# Patient Record
Sex: FEMALE | Race: WHITE | Employment: FULL TIME | URBAN - METROPOLITAN AREA
[De-identification: names, ages, dates, MRNs, and addresses within clinical notes are randomized per-mention and may not be internally consistent; named-entity substitution may affect disease eponyms.]

---

## 2024-03-25 ENCOUNTER — TELEPHONE (OUTPATIENT)
Age: 29
End: 2024-03-25

## 2024-03-25 NOTE — TELEPHONE ENCOUNTER
Caller: Patient     Doctor/Office: Shawn Nice     CB#: [phone]     Work or school note: Work    Return to work/school date: 4/9/24    Fax #: [phone]    Is there any restrictions that need to be updated? If so, what? Light duty when returning to work     Patient would also like to confirm if it's okay for her to start swimming yet ?   Please advise

## 2024-03-25 NOTE — TELEPHONE ENCOUNTER
Note placed and faxed. Lvm for pt that Dr. Escobar still says no swimming and wait until next visit to re-evaluate..

## 2024-04-08 ENCOUNTER — OFFICE VISIT (OUTPATIENT)
Age: 29
End: 2024-04-08

## 2024-04-08 ENCOUNTER — APPOINTMENT (OUTPATIENT)
Dept: RADIOLOGY | Facility: CLINIC | Age: 29
End: 2024-04-08
Payer: COMMERCIAL

## 2024-04-08 VITALS
WEIGHT: 208 LBS | HEIGHT: 61 IN | HEART RATE: 62 BPM | DIASTOLIC BLOOD PRESSURE: 77 MMHG | BODY MASS INDEX: 39.27 KG/M2 | SYSTOLIC BLOOD PRESSURE: 111 MMHG

## 2024-04-08 DIAGNOSIS — E55.9 VITAMIN D DEFICIENCY: ICD-10-CM

## 2024-04-08 DIAGNOSIS — S99.192D CLOSED FRACTURE OF BASE OF FIFTH METATARSAL BONE OF LEFT FOOT AT METAPHYSEAL-DIAPHYSEAL JUNCTION WITH ROUTINE HEALING, SUBSEQUENT ENCOUNTER: ICD-10-CM

## 2024-04-08 DIAGNOSIS — S99.192D CLOSED FRACTURE OF BASE OF FIFTH METATARSAL BONE OF LEFT FOOT AT METAPHYSEAL-DIAPHYSEAL JUNCTION WITH ROUTINE HEALING, SUBSEQUENT ENCOUNTER: Primary | ICD-10-CM

## 2024-04-08 DIAGNOSIS — Z98.890 POSTOPERATIVE STATE: ICD-10-CM

## 2024-04-08 PROCEDURE — 73630 X-RAY EXAM OF FOOT: CPT

## 2024-04-08 PROCEDURE — 99024 POSTOP FOLLOW-UP VISIT: CPT | Performed by: STUDENT IN AN ORGANIZED HEALTH CARE EDUCATION/TRAINING PROGRAM

## 2024-04-08 NOTE — LETTER
April 8, 2024     Patient: LYNDON Reed  YOB: 1995  Date of Visit: 4/8/2024      To Whom it May Concern:    LYNDON Reed is under my professional care. LYNDON was seen in my office on 4/8/2024. LYNDON may return to work with limitations of being allowed to wear her cam boot at all times until 4/23/2024 .    If you have any questions or concerns, please don't hesitate to call.         Sincerely,          Jamshid Escobar DPM        CC: No Recipients

## 2024-04-08 NOTE — LETTER
April 8, 2024     Patient: Jessica Reed  YOB: 1995  Date of Visit: 4/8/2024      To Whom it May Concern:    Jessica Reed is under my professional care. Jessica was seen in my office on 4/8/2024. Jessica may return to work with limitations of wearing her CAM boot until 4/23/24 .    If you have any questions or concerns, please don't hesitate to call.         Sincerely,          Jamshid Escobar DPM        CC: No Recipients

## 2024-04-08 NOTE — LETTER
April 8, 2024     Patient: Jessica Reed  YOB: 1995  Date of Visit: 4/8/2024      To Whom it May Concern:    Monika Reed is under my professional care. Jessica was seen in my office on 4/8/2024. Monika may return to work with limitations of being allowed to wear her CAM boot until 4/23/24 .    If you have any questions or concerns, please don't hesitate to call.         Sincerely,          Jamshid Escobar DPM        CC: No Recipients

## 2024-04-10 NOTE — PROGRESS NOTES
This patient was seen on  4/8/24 .    My role is Foot , Ankle, and Wound Specialist    ASSESSMENT     Diagnoses and all orders for this visit:    Closed fracture of base of fifth metatarsal bone of left foot at metaphyseal-diaphyseal junction with routine healing, subsequent encounter  -     XR foot 3+ vw left; Future    Postoperative state  -     XR foot 3+ vw left; Future    Vitamin D deficiency  -     Vitamin D 25 hydroxy; Future         Problem List Items Addressed This Visit    None  Visit Diagnoses       Closed fracture of base of fifth metatarsal bone of left foot at metaphyseal-diaphyseal junction with routine healing, subsequent encounter    -  Primary    Relevant Orders    XR foot 3+ vw left (Completed)    Postoperative state        Relevant Orders    XR foot 3+ vw left (Completed)    Vitamin D deficiency        Relevant Orders    Vitamin D 25 hydroxy          PLAN  -Monika remains stable postoperatively  -Weightbearing as tolerated to left foot in cam boot  -Continue vitamin D supplementation, we will retest this at the patient's next visit  -Return to clinic 2 weeks we will consider transition to sneakers at this time.    SUBJECTIVE    Chief Complaint:  Status post left fifth metatarsal open reduction with internal fixation, date of surgery 2/27/2024     Patient ID: MONIKA Reed     4/8/2024: Monika is doing well today: She complains of only minor pain to the plantar aspect of her fifth metatarsal head.  She states that she has been ambulating in her cam boot since her last appointment without complication.        The following portions of the patient's history were reviewed and updated as appropriate: allergies, current medications, past family history, past medical history, past social history, past surgical history and problem list.    Review of Systems   Constitutional: Negative.    HENT: Negative.     Respiratory: Negative.     Cardiovascular: Negative.    Gastrointestinal: Negative.   "  Musculoskeletal: Negative.    Skin: Negative.    Neurological: Negative.          OBJECTIVE      /77   Pulse 62   Ht 5' 1\" (1.549 m)   Wt 94.3 kg (208 lb)   BMI 39.30 kg/m²        Physical Exam  Constitutional:       Appearance: Normal appearance.   HENT:      Head: Normocephalic and atraumatic.   Eyes:      General:         Right eye: No discharge.         Left eye: No discharge.   Cardiovascular:      Rate and Rhythm: Normal rate and regular rhythm.      Pulses:           Dorsalis pedis pulses are 2+ on the right side and 2+ on the left side.        Posterior tibial pulses are 2+ on the right side and 2+ on the left side.   Pulmonary:      Effort: Pulmonary effort is normal.      Breath sounds: Normal breath sounds.   Skin:     General: Skin is warm.      Capillary Refill: Capillary refill takes less than 2 seconds.   Neurological:      Sensory: Sensation is intact. No sensory deficit.         MSK:  -Only mild pain on palpation to the fifth metatarsal head plantarly  -No gross deformities noted   -MMT is 5/5 to all muscle compartments of the lower extremity     Derm:  -No lesions, abrasions, or open wounds noted  -No noted interdigital maceration, peeling, malodor  -No callus formation noted on exam      "

## 2024-04-22 ENCOUNTER — APPOINTMENT (OUTPATIENT)
Dept: RADIOLOGY | Facility: CLINIC | Age: 29
End: 2024-04-22
Payer: COMMERCIAL

## 2024-04-22 ENCOUNTER — OFFICE VISIT (OUTPATIENT)
Age: 29
End: 2024-04-22

## 2024-04-22 VITALS
DIASTOLIC BLOOD PRESSURE: 76 MMHG | HEIGHT: 61 IN | SYSTOLIC BLOOD PRESSURE: 120 MMHG | WEIGHT: 208 LBS | HEART RATE: 75 BPM | BODY MASS INDEX: 39.27 KG/M2

## 2024-04-22 DIAGNOSIS — S99.192D CLOSED FRACTURE OF BASE OF FIFTH METATARSAL BONE OF LEFT FOOT AT METAPHYSEAL-DIAPHYSEAL JUNCTION WITH ROUTINE HEALING, SUBSEQUENT ENCOUNTER: ICD-10-CM

## 2024-04-22 DIAGNOSIS — S99.192D CLOSED FRACTURE OF BASE OF FIFTH METATARSAL BONE OF LEFT FOOT AT METAPHYSEAL-DIAPHYSEAL JUNCTION WITH ROUTINE HEALING, SUBSEQUENT ENCOUNTER: Primary | ICD-10-CM

## 2024-04-22 PROCEDURE — 99024 POSTOP FOLLOW-UP VISIT: CPT | Performed by: STUDENT IN AN ORGANIZED HEALTH CARE EDUCATION/TRAINING PROGRAM

## 2024-04-22 PROCEDURE — 73630 X-RAY EXAM OF FOOT: CPT

## 2024-04-22 NOTE — PATIENT INSTRUCTIONS
Purchase a supportive pair of sneakers such as Waddell, Lewis, Sasarahony, New Balance.  Some qualities to look for is that the shoe should bend only where the toes bend, the sole should not be too flimsy, it should have a wide toe box and a somewhat stiff heel support. Purchase a supportive over-the-counter pair of inserts such as Superfeet, powersteps, [employer].

## 2024-04-22 NOTE — LETTER
April 22, 2024     Patient: LYNDON Reed  YOB: 1995  Date of Visit: 4/22/2024      To Whom it May Concern:    LYNDON Reed is under my professional care. LYNDON was seen in my office on 4/22/2024. LYNDON may return to work on 4/22/2024 with no restrictions .    If you have any questions or concerns, please don't hesitate to call.         Sincerely,          Jamshid Escobar DPM        CC: No Recipients

## 2024-04-22 NOTE — PROGRESS NOTES
This patient was seen on  4/22/24 .    My role is Foot , Ankle, and Wound Specialist    ASSESSMENT     Diagnoses and all orders for this visit:    Closed fracture of base of fifth metatarsal bone of left foot at metaphyseal-diaphyseal junction with routine healing, subsequent encounter  -     X-ray foot left 3+ views; Future         Problem List Items Addressed This Visit    None  Visit Diagnoses       Closed fracture of base of fifth metatarsal bone of left foot at metaphyseal-diaphyseal junction with routine healing, subsequent encounter    -  Primary    Relevant Orders    X-ray foot left 3+ views          PLAN  -Monika remains stable postoperatively  -Weightbearing as tolerated to left foot in supportive sneakers, I did provide her with a list of sneakers that she should follow.  -Follow-up new vitamin D levels  -Monika may now return to work without restriction  -Return to clinic 4 weeks    X-ray of the left foot from April 22, 2024 interpreted independently: Medullary screw fixation across the fifth metatarsal Adame fracture remains intact with proper placement of fixation.  Osseous ingrowth noted across fracture sites suggesting routine healing    SUBJECTIVE    Chief Complaint:  Status post left fifth metatarsal open reduction with internal fixation, date of surgery 2/27/2024     Patient ID: MONIKA Reed     4/19/2024: Monika is doing well today: She complains of only minor pain to the plantar aspect of her fifth metatarsal head.  She states that she has been ambulating in sneakers since her last visit without complication.  She has been back to work and has been using her cam walker while at work.        The following portions of the patient's history were reviewed and updated as appropriate: allergies, current medications, past family history, past medical history, past social history, past surgical history and problem list.    Review of Systems   Constitutional: Negative.    HENT: Negative.     Respiratory:  "Negative.     Cardiovascular: Negative.    Gastrointestinal: Negative.    Musculoskeletal: Negative.    Skin: Negative.    Neurological: Negative.          OBJECTIVE      /76   Pulse 75   Ht 5' 1\" (1.549 m)   Wt 94.3 kg (208 lb)   BMI 39.30 kg/m²        Physical Exam  Constitutional:       Appearance: Normal appearance.   HENT:      Head: Normocephalic and atraumatic.   Eyes:      General:         Right eye: No discharge.         Left eye: No discharge.   Cardiovascular:      Rate and Rhythm: Normal rate and regular rhythm.      Pulses:           Dorsalis pedis pulses are 2+ on the right side and 2+ on the left side.        Posterior tibial pulses are 2+ on the right side and 2+ on the left side.   Pulmonary:      Effort: Pulmonary effort is normal.      Breath sounds: Normal breath sounds.   Skin:     General: Skin is warm.      Capillary Refill: Capillary refill takes less than 2 seconds.   Neurological:      Sensory: Sensation is intact. No sensory deficit.         MSK:  -Only mild pain on palpation to the fifth metatarsal head plantarly, no pain on palpation to the fifth metatarsal base laterally.  -No gross deformities noted   -MMT is 5/5 to all muscle compartments of the lower extremity     Derm:  -No lesions, abrasions, or open wounds noted  -No noted interdigital maceration, peeling, malodor  -No callus formation noted on exam      "

## 2024-04-23 DIAGNOSIS — Z00.6 ENCOUNTER FOR EXAMINATION FOR NORMAL COMPARISON OR CONTROL IN CLINICAL RESEARCH PROGRAM: ICD-10-CM

## 2024-05-02 ENCOUNTER — TELEPHONE (OUTPATIENT)
Age: 29
End: 2024-05-02

## 2024-05-02 NOTE — TELEPHONE ENCOUNTER
Caller: Monika Reed    Doctor and/or Office: Dr. Escobar/[facility]#: [phone]    Escalation: Surgery Patient has questions about an xray and notes on the xray that she does not understand. Requesting a return call. Thank you

## 2024-05-13 ENCOUNTER — OFFICE VISIT (OUTPATIENT)
Dept: FAMILY MEDICINE CLINIC | Facility: CLINIC | Age: 29
End: 2024-05-13
Payer: COMMERCIAL

## 2024-05-13 VITALS
BODY MASS INDEX: 40.97 KG/M2 | WEIGHT: 217 LBS | SYSTOLIC BLOOD PRESSURE: 118 MMHG | HEART RATE: 110 BPM | RESPIRATION RATE: 18 BRPM | HEIGHT: 61 IN | DIASTOLIC BLOOD PRESSURE: 64 MMHG | TEMPERATURE: 99 F | OXYGEN SATURATION: 97 %

## 2024-05-13 DIAGNOSIS — F33.0 MILD EPISODE OF RECURRENT MAJOR DEPRESSIVE DISORDER (HCC): ICD-10-CM

## 2024-05-13 DIAGNOSIS — E66.01 CLASS 3 SEVERE OBESITY DUE TO EXCESS CALORIES WITHOUT SERIOUS COMORBIDITY WITH BODY MASS INDEX (BMI) OF 40.0 TO 44.9 IN ADULT (HCC): ICD-10-CM

## 2024-05-13 DIAGNOSIS — F41.9 ANXIETY: Primary | ICD-10-CM

## 2024-05-13 PROBLEM — L20.89 FLEXURAL ATOPIC DERMATITIS: Status: ACTIVE | Noted: 2017-07-25

## 2024-05-13 PROCEDURE — 99214 OFFICE O/P EST MOD 30 MIN: CPT | Performed by: NURSE PRACTITIONER

## 2024-05-13 RX ORDER — FLUOXETINE 20 MG/1
20 TABLET, FILM COATED ORAL DAILY
Qty: 90 TABLET | Refills: 3 | Status: SHIPPED | OUTPATIENT
Start: 2024-05-13

## 2024-05-13 NOTE — PROGRESS NOTES
FAMILY PRACTICE OFFICE VISIT       NAME: LYNDON Reed  AGE: 29 y.o. SEX: female       : 1995        MRN: [id number]    DATE: 2024  TIME: 1:09 PM    Assessment and Plan   1. Anxiety  -     FLUoxetine (PROzac) 20 MG tablet; Take 1 tablet (20 mg total) by mouth daily    2. Mild episode of recurrent major depressive disorder (HCC)  -     FLUoxetine (PROzac) 20 MG tablet; Take 1 tablet (20 mg total) by mouth daily    3. Class 3 severe obesity due to excess calories without serious comorbidity with body mass index (BMI) of 40.0 to 44.9 in adult (HCC)  -     Semaglutide-Weight Management (WEGOVY) 0.25 MG/0.5ML; Inject 0.5 mL (0.25 mg total) under the skin once a week         There are no Patient Instructions on file for this visit.        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   HENT:  Negative for congestion and postnasal drip.    Eyes:  Negative for photophobia and visual disturbance.   Respiratory:  Negative for cough, shortness of breath and wheezing.    Cardiovascular:  Negative for chest pain and palpitations.   Gastrointestinal:  Negative for constipation, diarrhea, nausea and vomiting.   Genitourinary:  Negative for dysuria and frequency.   Musculoskeletal:  Negative for arthralgias and myalgias.   Skin:  Negative for rash.   Neurological:  Negative for  dizziness, light-headedness and headaches.   Hematological:  Negative for adenopathy.   Psychiatric/Behavioral:  Positive for decreased concentration and dysphoric mood. Negative for behavioral problems, sleep disturbance and suicidal ideas. The patient is nervous/anxious. The patient is not hyperactive.        Active Problem List     Patient Active Problem List   Diagnosis    Anxiety    Recurrent major depressive disorder, in partial remission (HCC)    Insomnia    Functional murmur    Obesity    Asthma    Upper respiratory tract infection    Viral illness    Closed fracture of base of fifth metatarsal bone of left foot at metaphyseal-diaphyseal junction, initial encounter    Adjustment disorder    Flexural atopic dermatitis    Mild intermittent asthma without complication    Mild episode of recurrent major depressive disorder (HCC)         Past Medical History:  Past Medical History:   Diagnosis Date    Allergic     Allergic rhinitis     Anesthesia complication     woke up in the middle of wisdom teeth removal    Anxiety 02/20/2018    Asthma     Asthma     Depression     Functional murmur 11/19/2012    History of back pain     History of depression     History of impacted cerumen     unspecified laterality    History of pharyngitis     History of streptococcal pharyngitis     History of tonsillitis     History of URI (upper respiratory infection)     Obesity 11/19/2012    Recurrent major depressive disorder, in partial remission (HCC) 02/20/2018    Seasonal allergies     Sinus problem     Toe fracture, left 02/18/2024    5th toe    Tooth wear     gem with dental bond-upper left    Viral illness 11/02/2023       Past Surgical History:  Past Surgical History:   Procedure Laterality Date    ORIF FOOT FRACTURE Left 2/27/2024    Procedure: LEFT FOOT 5TH METATARSAL OPEN REDUCTION WITH INTERNAL FIXATION;  Surgeon: Jamshid Escobar DPM;  Location: [facility];  Service: Podiatry    WISDOM TOOTH EXTRACTION         Family  History:  Family History   Problem Relation Age of Onset    Hypothyroidism Mother     Osteoporosis Mother     Breast cancer Mother 50    Thyroid disease Mother     Diabetes Father     Arthritis Father     Asthma Father     Heart defect Other         Cardiac disorder    Mental illness Neg Hx         Mother       Social History:  Social History     Socioeconomic History    Marital status: Single     Spouse name: Not on file    Number of children: Not on file    Years of education: Not on file    Highest education level: Not on file   Occupational History    Not on file   Tobacco Use    Smoking status: Former     Types: Cigarettes    Smokeless tobacco: Never    Tobacco comments:     History social smoking 2012 quit   Vaping Use    Vaping status: Never Used   Substance and Sexual Activity    Alcohol use: Not Currently    Drug use: Not Currently     Types: Cocaine     Comment: quit 2016    Sexual activity: Yes     Partners: Male     Birth control/protection: None   Other Topics Concern    Not on file   Social History Narrative    Always uses seat belt    Caffeine use    Exercise habits    No living will    No preference on Sikhism beliefs    Spouse/family support     Social Determinants of Health     Financial Resource Strain: Not on file   Food Insecurity: Not on file   Transportation Needs: Not on file   Physical Activity: Not on file   Stress: Not on file   Social Connections: Not on file   Intimate Partner Violence: Not on file   Housing Stability: Not on file       Objective     Vitals:    05/13/24 1023   BP: 118/64   Pulse: (!) 110   Resp: 18   Temp: 99 °F (37.2 °C)   SpO2: 97%     Wt Readings from Last 3 Encounters:   05/13/24 98.4 kg (217 lb)   04/22/24 94.3 kg (208 lb)   04/08/24 94.3 kg (208 lb)       Physical Exam  Vitals and nursing note reviewed.   Constitutional:       Appearance: Normal appearance.   HENT:      Head: Normocephalic and atraumatic.      Right Ear: Tympanic membrane, ear canal and external  "ear normal.      Left Ear: Tympanic membrane, ear canal and external ear normal.      Nose: Nose normal.      Mouth/Throat:      Mouth: Mucous membranes are moist.   Eyes:      Conjunctiva/sclera: Conjunctivae normal.   Cardiovascular:      Rate and Rhythm: Normal rate and regular rhythm.      Heart sounds: Normal heart sounds.   Pulmonary:      Effort: Pulmonary effort is normal.      Breath sounds: Normal breath sounds.   Abdominal:      General: Bowel sounds are normal.      Palpations: Abdomen is soft.   Musculoskeletal:         General: Normal range of motion.      Cervical back: Normal range of motion and neck supple.   Skin:     General: Skin is warm and dry.      Capillary Refill: Capillary refill takes less than 2 seconds.   Neurological:      General: No focal deficit present.      Mental Status: She is alert and oriented to person, place, and time.   Psychiatric:         Mood and Affect: Mood normal.         Behavior: Behavior normal.         Thought Content: Thought content normal.         Judgment: Judgment normal.         Pertinent Laboratory/Diagnostic Studies:  Lab Results   Component Value Date    GLUCOSE 94 09/21/2015    BUN 10 04/21/2023    CREATININE 0.70 04/21/2023    CALCIUM 9.4 04/21/2023     09/21/2015    K 4.1 04/21/2023    CO2 23 04/21/2023     04/21/2023     Lab Results   Component Value Date    ALT 58 04/21/2023    AST 34 04/21/2023    ALKPHOS 56 04/21/2023       Lab Results   Component Value Date    WBC 6.96 05/27/2021    HGB 14.6 05/27/2021    HCT 43.7 05/27/2021    MCV 93 05/27/2021     05/27/2021       No results found for: \"TSH\"    No results found for: \"CHOL\"  Lab Results   Component Value Date    TRIG 207 (H) 04/21/2023     Lab Results   Component Value Date    HDL 54 04/21/2023     Lab Results   Component Value Date    LDLCALC 113 (H) 04/21/2023     No results found for: \"HGBA1C\"    Results for orders placed or performed during the hospital encounter of 02/27/24 "   POCT pregnancy, urine   Result Value Ref Range    EXT Preg Test, Ur Negative Negative    Control Valid Valid       No orders of the defined types were placed in this encounter.      ALLERGIES:  Allergies   Allergen Reactions    Pineapple - Food Allergy Anaphylaxis     Itching/closing of the throat    Amoxicillin GI Intolerance    Mold Extract [Trichophyton] Allergic Rhinitis    Seasonal Ic [Cholestatin] Allergic Rhinitis       Current Medications     Current Outpatient Medications   Medication Sig Dispense Refill    albuterol (Ventolin HFA) 90 mcg/act inhaler Inhale 2 puffs every 4 (four) hours as needed for wheezing 18 g 5    citalopram (CeleXA) 20 mg tablet Take 1 tablet (20 mg total) by mouth daily 90 tablet 1    Clocortolone Pivalate (Cloderm) 0.1 % cream Apply topically 2 (two) times a day To affected area 45 g 0    Elderberry 500 MG CAPS Take by mouth      fexofenadine (ALLEGRA) 180 MG tablet Take by mouth      FLUoxetine (PROzac) 20 MG tablet Take 1 tablet (20 mg total) by mouth daily 90 tablet 3    MAGNESIUM PO Take by mouth daily at bedtime      Multiple Vitamin (multivitamin) capsule Take 1 capsule by mouth daily      Omega-3 Fatty Acids (FISH OIL PO) Take by mouth daily at bedtime      ondansetron (ZOFRAN) 4 mg tablet Take 1 tablet (4 mg total) by mouth every 6 (six) hours 12 tablet 0    Semaglutide-Weight Management (WEGOVY) 0.25 MG/0.5ML Inject 0.5 mL (0.25 mg total) under the skin once a week 2 mL 1    ergocalciferol (VITAMIN D2) 50,000 units Take 1 capsule (50,000 Units total) by mouth once a week (Patient not taking: Reported on 5/13/2024) 8 capsule 0    oxyCODONE-acetaminophen (Percocet) 5-325 mg per tablet Take 1 tablet by mouth every 6 (six) hours as needed for moderate pain for up to 16 doses Max Daily Amount: 4 tablets (Patient not taking: Reported on 3/4/2024) 16 tablet 0     No current facility-administered medications for this visit.         Health Maintenance     Health Maintenance   Topic  Date Due    Depression Follow-up Plan  Never done    Annual Physical  01/18/2024    HIV Screening  06/13/2024 (Originally 5/6/2009)    COVID-19 Vaccine (4 - 2023-24 season) 08/13/2024 (Originally 9/1/2023)    Hepatitis C Screening  06/13/2026 (Originally 1995)    Influenza Vaccine (Season Ended) 09/01/2024    Depression Screening  02/22/2025    DTaP,Tdap,and Td Vaccines (3 - Td or Tdap) 02/18/2026    Cervical Cancer Screening  12/08/2027    Zoster Vaccine (1 of 2) 05/06/2044    Pneumococcal Vaccine: Pediatrics (0 to 5 Years) and At-Risk Patients (6 to 64 Years)  Completed    HPV Vaccine  Completed    HIB Vaccine  Aged Out    IPV Vaccine  Aged Out    Hepatitis A Vaccine  Aged Out    Meningococcal ACWY Vaccine  Aged Out     Immunization History   Administered Date(s) Administered    COVID-19 MODERNA VACC 0.5 ML IM 02/12/2021, 03/12/2021, 12/16/2021    H1N1, All Formulations 10/31/2009    HPV Quadrivalent 08/24/2010, 10/26/2010, 02/03/2011    INFLUENZA 09/25/2015, 10/15/2016, 08/15/2019, 10/29/2020    Influenza Quadrivalent, 6-35 Months IM 10/15/2016    Influenza, injectable, quadrivalent, preservative free 0.5 mL 10/19/2018, 10/04/2019, 09/15/2021, 10/06/2022    Influenza, recombinant, quadrivalent,injectable, preservative free 10/29/2020    Influenza, seasonal, injectable 09/25/2015    Meningococcal, Unknown Serogroups 08/01/2008    Pneumococcal Conjugate Vaccine 20-valent (Pcv20), Polysace 01/18/2023    Tdap 08/01/2008, 02/18/2016       Depression Screening and Follow-up Plan: Patient's depression screening was positive with a PHQ-9 score of 10.     Depression Screening Follow-up Plan: Patient's depression screening was positive with a PHQ-2 score of . Their PHQ-9 score was . Patient assessed for underlying major depression. They have no active suicidal ideations. Brief counseling provided and recommend additional follow-up/re-evaluation next office visit.     ISAIAS Saravia

## 2024-05-15 ENCOUNTER — TELEPHONE (OUTPATIENT)
Dept: FAMILY MEDICINE CLINIC | Facility: CLINIC | Age: 29
End: 2024-05-15

## 2024-05-15 ENCOUNTER — TELEPHONE (OUTPATIENT)
Age: 29
End: 2024-05-15

## 2024-05-15 NOTE — TELEPHONE ENCOUNTER
Patient calling stating that she needs a prior auth for Wegovy . Patient aware that it can take up to 21 business days once the PA is started .  Brenna Morrison

## 2024-05-17 NOTE — TELEPHONE ENCOUNTER
Patient called back for an update on this PA. Message was sent to Sandy for update.   Brenna Morrison

## 2024-05-17 NOTE — TELEPHONE ENCOUNTER
PA for WEGOVY    Submitted via    []CMM-KEY   [x]Jeremy-Case ID # [id number]  []Faxed to plan   []Other website   []Phone call Case ID #     Office notes sent, clinical questions answered. Awaiting determination    Turnaround time for your insurance to make a decision on your Prior Authorization can take 7-21 business days.

## 2024-05-18 ENCOUNTER — APPOINTMENT (OUTPATIENT)
Dept: LAB | Facility: CLINIC | Age: 29
End: 2024-05-18
Payer: COMMERCIAL

## 2024-05-18 DIAGNOSIS — E78.5 HYPERLIPIDEMIA, UNSPECIFIED HYPERLIPIDEMIA TYPE: ICD-10-CM

## 2024-05-18 DIAGNOSIS — Z00.6 ENCOUNTER FOR EXAMINATION FOR NORMAL COMPARISON OR CONTROL IN CLINICAL RESEARCH PROGRAM: ICD-10-CM

## 2024-05-18 LAB
CHOLEST SERPL-MCNC: 194 MG/DL
HDLC SERPL-MCNC: 62 MG/DL
LDLC SERPL CALC-MCNC: 86 MG/DL (ref 0–100)
TRIGL SERPL-MCNC: 231 MG/DL

## 2024-05-18 PROCEDURE — 36415 COLL VENOUS BLD VENIPUNCTURE: CPT

## 2024-05-18 PROCEDURE — 80061 LIPID PANEL: CPT

## 2024-05-20 ENCOUNTER — OFFICE VISIT (OUTPATIENT)
Age: 29
End: 2024-05-20

## 2024-05-20 ENCOUNTER — APPOINTMENT (OUTPATIENT)
Dept: RADIOLOGY | Facility: CLINIC | Age: 29
End: 2024-05-20
Payer: COMMERCIAL

## 2024-05-20 VITALS
SYSTOLIC BLOOD PRESSURE: 114 MMHG | WEIGHT: 217 LBS | BODY MASS INDEX: 40.97 KG/M2 | DIASTOLIC BLOOD PRESSURE: 79 MMHG | HEIGHT: 61 IN | HEART RATE: 68 BPM

## 2024-05-20 DIAGNOSIS — S99.192D CLOSED FRACTURE OF BASE OF FIFTH METATARSAL BONE OF LEFT FOOT AT METAPHYSEAL-DIAPHYSEAL JUNCTION WITH ROUTINE HEALING, SUBSEQUENT ENCOUNTER: ICD-10-CM

## 2024-05-20 DIAGNOSIS — S99.192D CLOSED FRACTURE OF BASE OF FIFTH METATARSAL BONE OF LEFT FOOT AT METAPHYSEAL-DIAPHYSEAL JUNCTION WITH ROUTINE HEALING, SUBSEQUENT ENCOUNTER: Primary | ICD-10-CM

## 2024-05-20 PROCEDURE — 73630 X-RAY EXAM OF FOOT: CPT

## 2024-05-20 PROCEDURE — 99024 POSTOP FOLLOW-UP VISIT: CPT | Performed by: STUDENT IN AN ORGANIZED HEALTH CARE EDUCATION/TRAINING PROGRAM

## 2024-05-20 NOTE — TELEPHONE ENCOUNTER
Patient made aware and informed us she will be paying out of pocket for Wegovy.  Advised to call the pharmacy to let them know as well to dispense.

## 2024-05-20 NOTE — TELEPHONE ENCOUNTER
PA for Wegovy Denied / Excluded     Reason: Plan Exclusion      Message sent to office clinical pool Yes    Denial letter scanned into Media Yes      **Please follow up with your patient regarding denial / exclusion and next steps**

## 2024-05-20 NOTE — PROGRESS NOTES
This patient was seen on  5/20/24 .    My role is Foot , Ankle, and Wound Specialist    ASSESSMENT     Diagnoses and all orders for this visit:    Closed fracture of base of fifth metatarsal bone of left foot at metaphyseal-diaphyseal junction with routine healing, subsequent encounter  -     XR foot 3+ vw left; Future         Problem List Items Addressed This Visit    None  Visit Diagnoses       Closed fracture of base of fifth metatarsal bone of left foot at metaphyseal-diaphyseal junction with routine healing, subsequent encounter    -  Primary    Relevant Orders    XR foot 3+ vw left          PLAN  -Monika remains stable postoperatively  -Weightbearing as tolerated to left foot in supportive sneakers, I did provide her with a list of sneakers that she should follow.  -Return to clinic as needed for new or worsening podiatric concerns    X-ray of the left foot from May 20, 2024 interpreted independently: Medullary screw fixation across the fifth metatarsal Adame fracture remains intact with proper placement of fixation.  Fracture site appears to be fully healed at this time    SUBJECTIVE    Chief Complaint:  Status post left fifth metatarsal open reduction with internal fixation, date of surgery 2/27/2024     Patient ID: MONIKA Reed     5/20/2024: Monika is doing well today: She complains of only minor pain to the plantar aspect of her fifth metatarsal head.  She states that she has been ambulating in sneakers since her last visit without complication.  She has been back to work without complaints.        The following portions of the patient's history were reviewed and updated as appropriate: allergies, current medications, past family history, past medical history, past social history, past surgical history and problem list.    Review of Systems   Constitutional: Negative.    HENT: Negative.     Respiratory: Negative.     Cardiovascular: Negative.    Gastrointestinal: Negative.    Musculoskeletal: Negative.  "   Skin: Negative.    Neurological: Negative.          OBJECTIVE      /79   Pulse 68   Ht 5' 1\" (1.549 m)   Wt 98.4 kg (217 lb)   BMI 41.00 kg/m²        Physical Exam  Constitutional:       Appearance: Normal appearance.   HENT:      Head: Normocephalic and atraumatic.   Eyes:      General:         Right eye: No discharge.         Left eye: No discharge.   Cardiovascular:      Rate and Rhythm: Normal rate and regular rhythm.      Pulses:           Dorsalis pedis pulses are 2+ on the right side and 2+ on the left side.        Posterior tibial pulses are 2+ on the right side and 2+ on the left side.   Pulmonary:      Effort: Pulmonary effort is normal.      Breath sounds: Normal breath sounds.   Skin:     General: Skin is warm.      Capillary Refill: Capillary refill takes less than 2 seconds.   Neurological:      Sensory: Sensation is intact. No sensory deficit.         MSK:  -Only mild pain on palpation to the fifth metatarsal head plantarly, no pain on palpation to the fifth metatarsal base laterally.  -No gross deformities noted   -MMT is 5/5 to all muscle compartments of the lower extremity     Derm:  -No lesions, abrasions, or open wounds noted  -No noted interdigital maceration, peeling, malodor  -No callus formation noted on exam      " hide

## 2024-05-20 NOTE — TELEPHONE ENCOUNTER
Duplicate PA encounter please see other telephone encounter from 5-15-24 regarding Wegovy PA. Please review patient's chart to see status of PA and to document anything regarding this medication in regards to anything regarding the authorization process etc before creating another encounter Thank You.

## 2024-06-12 ENCOUNTER — OFFICE VISIT (OUTPATIENT)
Dept: FAMILY MEDICINE CLINIC | Facility: CLINIC | Age: 29
End: 2024-06-12
Payer: COMMERCIAL

## 2024-06-12 VITALS
BODY MASS INDEX: 40.22 KG/M2 | SYSTOLIC BLOOD PRESSURE: 120 MMHG | DIASTOLIC BLOOD PRESSURE: 80 MMHG | TEMPERATURE: 97.9 F | HEIGHT: 61 IN | WEIGHT: 213 LBS | RESPIRATION RATE: 17 BRPM | OXYGEN SATURATION: 94 % | HEART RATE: 73 BPM

## 2024-06-12 DIAGNOSIS — E66.01 CLASS 3 SEVERE OBESITY DUE TO EXCESS CALORIES WITHOUT SERIOUS COMORBIDITY WITH BODY MASS INDEX (BMI) OF 40.0 TO 44.9 IN ADULT (HCC): ICD-10-CM

## 2024-06-12 DIAGNOSIS — L70.0 ACNE VULGARIS: Primary | ICD-10-CM

## 2024-06-12 DIAGNOSIS — F33.0 MILD EPISODE OF RECURRENT MAJOR DEPRESSIVE DISORDER (HCC): ICD-10-CM

## 2024-06-12 PROCEDURE — 99214 OFFICE O/P EST MOD 30 MIN: CPT | Performed by: NURSE PRACTITIONER

## 2024-06-12 RX ORDER — CLINDAMYCIN PHOSPHATE 10 MG/G
GEL TOPICAL 2 TIMES DAILY
Qty: 60 G | Refills: 5 | Status: SHIPPED | OUTPATIENT
Start: 2024-06-12

## 2024-06-12 RX ORDER — SEMAGLUTIDE 0.5 MG/.5ML
INJECTION, SOLUTION SUBCUTANEOUS
Qty: 2 ML | Refills: 0 | Status: SHIPPED | OUTPATIENT
Start: 2024-06-12

## 2024-06-12 NOTE — PROGRESS NOTES
FAMILY PRACTICE OFFICE VISIT       NAME: LYNDON Reed  AGE: 29 y.o. SEX: female       : 1995        MRN: [id number]    DATE: 2024  TIME: 12:48 AM    Assessment and Plan   1. Acne vulgaris  -     benzoyl peroxide 5 % external liquid; Apply topically 2 (two) times a day  -     clindamycin 1 % gel; Apply topically 2 (two) times a day  2. Class 3 severe obesity due to excess calories without serious comorbidity with body mass index (BMI) of 40.0 to 44.9 in adult (HCC)  -     Semaglutide-Weight Management (Wegovy) 0.5 MG/0.5ML; Inject 0.5 mg under the skin weekly  3. Mild episode of recurrent major depressive disorder ([facility])  Assessment & Plan:  Doing well on fluoxetine  Cont dose         There are no Patient Instructions on file for this visit.        Chief Complaint     Chief Complaint   Patient presents with    Follow-up     Pt being seen for follow up    Anxiety    Depression     Acne         History of Present Illness   LYNDON Reed is a 29 y.o.-year-old female who is here for f/u to depression/anxiety  Doing well on fluoxetine  Stopped celexa and well controlled on new meds  No si or hi  Started wegovy  No side affects  Doing well on meds  Less hungry  Stopped taking oral contraceptive and now having acne on her face  Needs treatment      Review of Systems   Review of Systems   Constitutional:  Negative for fatigue and fever.   HENT:  Negative for congestion and postnasal drip.    Eyes:  Negative for photophobia and visual disturbance.   Respiratory:  Negative for cough, shortness of breath and wheezing.    Cardiovascular:  Negative for chest pain and palpitations.   Gastrointestinal:  Negative for constipation, diarrhea, nausea and vomiting.   Genitourinary:  Negative for dysuria and frequency.   Musculoskeletal:  Negative for arthralgias and myalgias.   Skin:  Negative for rash.   Neurological:  Negative for dizziness, light-headedness and headaches.   Hematological:  Negative for adenopathy.    Psychiatric/Behavioral:  Negative for agitation, behavioral problems, confusion, decreased concentration, dysphoric mood, hallucinations, self-injury, sleep disturbance and suicidal ideas. The patient is not nervous/anxious and is not hyperactive.        Active Problem List     Patient Active Problem List   Diagnosis    Anxiety    Insomnia    Functional murmur    Obesity    Flexural atopic dermatitis    Mild intermittent asthma without complication    Mild episode of recurrent major depressive disorder (HCC)    Acne vulgaris         Past Medical History:  Past Medical History:   Diagnosis Date    Allergic     Allergic rhinitis     Anesthesia complication     woke up in the middle of wisdom teeth removal    Anxiety 02/20/2018    Asthma     Asthma     Depression     Functional murmur 11/19/2012    History of back pain     History of depression     History of impacted cerumen     unspecified laterality    History of pharyngitis     History of streptococcal pharyngitis     History of tonsillitis     History of URI (upper respiratory infection)     Obesity 11/19/2012    Recurrent major depressive disorder, in partial remission (HCC) 02/20/2018    Seasonal allergies     Sinus problem     Toe fracture, left 02/18/2024    5th toe    Tooth wear     gem with dental bond-upper left    Viral illness 11/02/2023       Past Surgical History:  Past Surgical History:   Procedure Laterality Date    ORIF FOOT FRACTURE Left 2/27/2024    Procedure: LEFT FOOT 5TH METATARSAL OPEN REDUCTION WITH INTERNAL FIXATION;  Surgeon: Jamshid Escobar DPM;  Location: [facility];  Service: Podiatry    WISDOM TOOTH EXTRACTION         Family History:  Family History   Problem Relation Age of Onset    Hypothyroidism Mother     Osteoporosis Mother     Breast cancer Mother 50    Thyroid disease Mother     Diabetes Father     Arthritis Father     Asthma Father     Heart defect Other         Cardiac disorder    Mental illness Neg Hx         Mother        Social History:  Social History     Socioeconomic History    Marital status: Single     Spouse name: Not on file    Number of children: Not on file    Years of education: Not on file    Highest education level: Not on file   Occupational History    Not on file   Tobacco Use    Smoking status: Former     Types: Cigarettes    Smokeless tobacco: Never    Tobacco comments:     History social smoking 2012 quit   Vaping Use    Vaping status: Never Used   Substance and Sexual Activity    Alcohol use: Not Currently    Drug use: Not Currently     Types: Cocaine     Comment: quit 2016    Sexual activity: Yes     Partners: Male     Birth control/protection: None   Other Topics Concern    Not on file   Social History Narrative    Always uses seat belt    Caffeine use    Exercise habits    No living will    No preference on [employer] beliefs    Spouse/family support     Social Determinants of Health     Financial Resource Strain: Not on file   Food Insecurity: Not on file   Transportation Needs: Not on file   Physical Activity: Not on file   Stress: Not on file   Social Connections: Not on file   Intimate Partner Violence: Not on file   Housing Stability: Not on file       Objective     Vitals:    06/12/24 1658   BP: 120/80   Pulse: 73   Resp: 17   Temp: 97.9 °F (36.6 °C)   SpO2: 94%     Wt Readings from Last 3 Encounters:   06/12/24 96.6 kg (213 lb)   05/20/24 98.4 kg (217 lb)   05/13/24 98.4 kg (217 lb)       Physical Exam  Vitals and nursing note reviewed.   Constitutional:       Appearance: Normal appearance.   HENT:      Head: Normocephalic and atraumatic.      Right Ear: Tympanic membrane, ear canal and external ear normal.      Left Ear: Tympanic membrane, ear canal and external ear normal.      Nose: Nose normal.      Mouth/Throat:      Mouth: Mucous membranes are moist.   Eyes:      Conjunctiva/sclera: Conjunctivae normal.   Cardiovascular:      Rate and Rhythm: Normal rate and regular rhythm.      Heart sounds:  "Normal heart sounds.   Pulmonary:      Effort: Pulmonary effort is normal.      Breath sounds: Normal breath sounds.   Abdominal:      General: Bowel sounds are normal.      Palpations: Abdomen is soft.   Musculoskeletal:         General: Normal range of motion.      Cervical back: Normal range of motion and neck supple.   Skin:     General: Skin is warm and dry.      Capillary Refill: Capillary refill takes less than 2 seconds.   Neurological:      General: No focal deficit present.      Mental Status: She is alert and oriented to person, place, and time.   Psychiatric:         Mood and Affect: Mood normal.         Behavior: Behavior normal.         Thought Content: Thought content normal.         Judgment: Judgment normal.         Pertinent Laboratory/Diagnostic Studies:  Lab Results   Component Value Date    GLUCOSE 94 09/21/2015    BUN 10 04/21/2023    CREATININE 0.70 04/21/2023    CALCIUM 9.4 04/21/2023     09/21/2015    K 4.1 04/21/2023    CO2 23 04/21/2023     04/21/2023     Lab Results   Component Value Date    ALT 58 04/21/2023    AST 34 04/21/2023    ALKPHOS 56 04/21/2023       Lab Results   Component Value Date    WBC 6.96 05/27/2021    HGB 14.6 05/27/2021    HCT 43.7 05/27/2021    MCV 93 05/27/2021     05/27/2021       No results found for: \"TSH\"    No results found for: \"CHOL\"  Lab Results   Component Value Date    TRIG 231 (H) 05/18/2024     Lab Results   Component Value Date    HDL 62 05/18/2024     Lab Results   Component Value Date    LDLCALC 86 05/18/2024     No results found for: \"HGBA1C\"    Results for orders placed or performed in visit on 05/18/24   Lipid Panel with Direct LDL reflex   Result Value Ref Range    Cholesterol 194 See Comment mg/dL    Triglycerides 231 (H) See Comment mg/dL    HDL, Direct 62 >=50 mg/dL    LDL Calculated 86 0 - 100 mg/dL       No orders of the defined types were placed in this encounter.      ALLERGIES:  Allergies   Allergen Reactions    Pineapple " - Food Allergy Anaphylaxis     Itching/closing of the throat    Amoxicillin GI Intolerance    Mold Extract [Trichophyton] Allergic Rhinitis    Seasonal Ic [Cholestatin] Allergic Rhinitis       Current Medications     Current Outpatient Medications   Medication Sig Dispense Refill    albuterol (Ventolin HFA) 90 mcg/act inhaler Inhale 2 puffs every 4 (four) hours as needed for wheezing 18 g 5    benzoyl peroxide 5 % external liquid Apply topically 2 (two) times a day 236 mL 5    clindamycin 1 % gel Apply topically 2 (two) times a day 60 g 5    Clocortolone Pivalate (Cloderm) 0.1 % cream Apply topically 2 (two) times a day To affected area 45 g 0    fexofenadine (ALLEGRA) 180 MG tablet Take by mouth      FLUoxetine (PROzac) 20 MG tablet Take 1 tablet (20 mg total) by mouth daily 90 tablet 3    MAGNESIUM PO Take by mouth daily at bedtime      Multiple Vitamin (multivitamin) capsule Take 1 capsule by mouth daily      Omega-3 Fatty Acids (FISH OIL PO) Take by mouth daily at bedtime      ondansetron (ZOFRAN) 4 mg tablet Take 1 tablet (4 mg total) by mouth every 6 (six) hours 12 tablet 0    Semaglutide-Weight Management (Wegovy) 0.5 MG/0.5ML Inject 0.5 mg under the skin weekly 2 mL 0    Elderberry 500 MG CAPS Take by mouth (Patient not taking: Reported on 6/12/2024)       No current facility-administered medications for this visit.         Health Maintenance     Health Maintenance   Topic Date Due    Annual Physical  01/18/2024    Influenza Vaccine (Season Ended) 09/01/2024    HIV Screening  06/13/2024 (Originally 5/6/2009)    COVID-19 Vaccine (4 - 2023-24 season) 08/13/2024 (Originally 9/1/2023)    Hepatitis C Screening  06/13/2026 (Originally 1995)    Depression Screening  05/13/2025    Depression Follow-up Plan  05/13/2025    DTaP,Tdap,and Td Vaccines (3 - Td or Tdap) 02/18/2026    Cervical Cancer Screening  12/08/2027    Zoster Vaccine (1 of 2) 05/06/2044    RSV Vaccine Age 60+ Years (1 - 1-dose 60+ series)  05/06/2054    Pneumococcal Vaccine: Pediatrics (0 to 5 Years) and At-Risk Patients (6 to 64 Years)  Completed    HPV Vaccine  Completed    RSV Vaccine age 0-20 Months  Aged Out    HIB Vaccine  Aged Out    IPV Vaccine  Aged Out    Hepatitis A Vaccine  Aged Out    Meningococcal ACWY Vaccine  Aged Out     Immunization History   Administered Date(s) Administered    COVID-19 MODERNA VACC 0.5 ML IM 02/12/2021, 03/12/2021, 12/16/2021    H1N1, All Formulations 10/31/2009    HPV Quadrivalent 08/24/2010, 10/26/2010, 02/03/2011    INFLUENZA 09/25/2015, 10/15/2016, 08/15/2019, 10/29/2020    Influenza Quadrivalent, 6-35 Months IM 10/15/2016    Influenza, injectable, quadrivalent, preservative free 0.5 mL 10/19/2018, 10/04/2019, 09/15/2021, 10/06/2022    Influenza, recombinant, quadrivalent,injectable, preservative free 10/29/2020    Influenza, seasonal, injectable 09/25/2015    Meningococcal, Unknown Serogroups 08/01/2008    Pneumococcal Conjugate Vaccine 20-valent (Pcv20), Polysace 01/18/2023    Tdap 08/01/2008, 02/18/2016          ISAIAS Saravia

## 2024-06-13 PROBLEM — B34.9 VIRAL ILLNESS: Status: RESOLVED | Noted: 2023-11-02 | Resolved: 2024-06-13

## 2024-06-13 PROBLEM — J06.9 UPPER RESPIRATORY TRACT INFECTION: Status: RESOLVED | Noted: 2023-07-06 | Resolved: 2024-06-13

## 2024-06-13 PROBLEM — S99.192A CLOSED FRACTURE OF BASE OF FIFTH METATARSAL BONE OF LEFT FOOT AT METAPHYSEAL-DIAPHYSEAL JUNCTION, INITIAL ENCOUNTER: Status: RESOLVED | Noted: 2024-02-18 | Resolved: 2024-06-13

## 2024-06-13 PROBLEM — F33.41 RECURRENT MAJOR DEPRESSIVE DISORDER, IN PARTIAL REMISSION (HCC): Status: RESOLVED | Noted: 2018-02-20 | Resolved: 2024-06-13

## 2024-06-13 PROBLEM — L70.0 ACNE VULGARIS: Status: ACTIVE | Noted: 2024-06-13

## 2024-06-13 PROBLEM — J45.909 ASTHMA: Status: RESOLVED | Noted: 2022-06-21 | Resolved: 2024-06-13

## 2024-06-13 LAB
APOB+LDLR+PCSK9 GENE MUT ANL BLD/T: NOT DETECTED
BRCA1+BRCA2 DEL+DUP + FULL MUT ANL BLD/T: NOT DETECTED
MLH1+MSH2+MSH6+PMS2 GN DEL+DUP+FUL M: NOT DETECTED

## 2024-06-14 ENCOUNTER — TELEPHONE (OUTPATIENT)
Dept: FAMILY MEDICINE CLINIC | Facility: CLINIC | Age: 29
End: 2024-06-14

## 2024-06-14 NOTE — TELEPHONE ENCOUNTER
Fax received for prior auth request to Wegovy 0.5 mg.  Key: [id number]  Please initiate prior auth. Thank you

## 2024-06-26 NOTE — TELEPHONE ENCOUNTER
Duplicate Prior Auth request / encounter please see telephone encounter from 5/15 regarding WEGOVY PA. Please review patient's chart to see status of PA and to document anything regarding this medication in regards to anything regarding the authorization process etc before creating another encounter Thank You.

## 2024-07-10 ENCOUNTER — TELEPHONE (OUTPATIENT)
Dept: FAMILY MEDICINE CLINIC | Facility: CLINIC | Age: 29
End: 2024-07-10

## 2024-07-10 NOTE — TELEPHONE ENCOUNTER
Patient called the RX Refill Line. Message is being forwarded to the office.     Patient is requesting the next dose of wegovy.     Please contact patient at [phone]

## 2024-07-12 DIAGNOSIS — E66.01 CLASS 3 SEVERE OBESITY DUE TO EXCESS CALORIES WITHOUT SERIOUS COMORBIDITY WITH BODY MASS INDEX (BMI) OF 40.0 TO 44.9 IN ADULT (HCC): Primary | ICD-10-CM

## 2024-07-12 RX ORDER — SEMAGLUTIDE 1 MG/.5ML
INJECTION, SOLUTION SUBCUTANEOUS
Qty: 2 ML | Refills: 0 | Status: SHIPPED | OUTPATIENT
Start: 2024-07-12

## 2024-08-14 ENCOUNTER — RA CDI HCC (OUTPATIENT)
Dept: OTHER | Facility: HOSPITAL | Age: 29
End: 2024-08-14

## 2024-08-21 ENCOUNTER — OFFICE VISIT (OUTPATIENT)
Dept: FAMILY MEDICINE CLINIC | Facility: CLINIC | Age: 29
End: 2024-08-21
Payer: COMMERCIAL

## 2024-08-21 VITALS
HEIGHT: 61 IN | OXYGEN SATURATION: 97 % | HEART RATE: 102 BPM | SYSTOLIC BLOOD PRESSURE: 104 MMHG | RESPIRATION RATE: 16 BRPM | BODY MASS INDEX: 39.04 KG/M2 | TEMPERATURE: 98.3 F | DIASTOLIC BLOOD PRESSURE: 80 MMHG | WEIGHT: 206.8 LBS

## 2024-08-21 DIAGNOSIS — J45.20 MILD INTERMITTENT ASTHMA WITHOUT COMPLICATION: Primary | ICD-10-CM

## 2024-08-21 DIAGNOSIS — E66.01 CLASS 2 SEVERE OBESITY DUE TO EXCESS CALORIES WITH SERIOUS COMORBIDITY AND BODY MASS INDEX (BMI) OF 39.0 TO 39.9 IN ADULT (HCC): ICD-10-CM

## 2024-08-21 PROCEDURE — 99213 OFFICE O/P EST LOW 20 MIN: CPT | Performed by: NURSE PRACTITIONER

## 2024-08-21 RX ORDER — MOMETASONE FUROATE 110 UG/1
2 INHALANT RESPIRATORY (INHALATION) EVERY EVENING
Qty: 1 EACH | Refills: 1 | Status: SHIPPED | OUTPATIENT
Start: 2024-08-21

## 2024-08-21 RX ORDER — SEMAGLUTIDE 1.7 MG/.75ML
INJECTION, SOLUTION SUBCUTANEOUS
Qty: 3 ML | Refills: 5 | Status: SHIPPED | OUTPATIENT
Start: 2024-08-21

## 2024-08-21 RX ORDER — METHYLPREDNISOLONE 4 MG
TABLET, DOSE PACK ORAL
Qty: 21 EACH | Refills: 0 | Status: SHIPPED | OUTPATIENT
Start: 2024-08-21

## 2024-08-21 NOTE — PROGRESS NOTES
FAMILY PRACTICE OFFICE VISIT       NAME: LYNDON Reed  AGE: 29 y.o. SEX: female       : 1995        MRN: [id number]    DATE: 2024  TIME: 4:04 PM    Assessment and Plan   1. Mild intermittent asthma without complication  -     methylPREDNISolone 4 MG tablet therapy pack; Use as directed on package  -     mometasone (Asmanex, 30 Metered Doses,) 110 mcg/actuation AEPB inhaler; Inhale 2 puffs every evening Rinse mouth after use.  2. Class 2 severe obesity due to excess calories with serious comorbidity and body mass index (BMI) of 39.0 to 39.9 in adult (HCC)  -     Semaglutide-Weight Management (Wegovy) 1.7 MG/0.75ML; Inject 1.7 mg under the skin weekly       There are no Patient Instructions on file for this visit.        Chief Complaint     Chief Complaint   Patient presents with    Follow-up     Patient being seen for a 2 ,month follow up visit       History of Present Illness   LYNDON Reed is a 29 y.o.-year-old female who is here for f/u to obesity and asthma  Asthma is flaring  Not sure she is sick  Feels like elephant on her chest  Was out sick for the past two days from work  Weight started at 217, now is 206  Lost 11 pounds since may  Feels well on wegovy  Was on 1mg     Review of Systems   Review of Systems   Constitutional:  Negative for fatigue and fever.   HENT:  Negative for congestion, postnasal drip and rhinorrhea.    Eyes:  Negative for photophobia and visual disturbance.   Respiratory:  Positive for cough, chest tightness and wheezing.    Cardiovascular:  Negative for chest pain and palpitations.   Gastrointestinal:  Negative for constipation, diarrhea, nausea and vomiting.   Genitourinary:  Negative for dysuria and frequency.   Musculoskeletal:  Negative for arthralgias and myalgias.   Skin:  Negative for rash.   Neurological:  Negative for dizziness, light-headedness and headaches.   Hematological:  Negative for adenopathy.   Psychiatric/Behavioral:  Negative for dysphoric mood  and sleep disturbance. The patient is not nervous/anxious.        Active Problem List     Patient Active Problem List   Diagnosis    Anxiety    Insomnia    Functional murmur    Obesity    Flexural atopic dermatitis    Mild intermittent asthma without complication    Mild episode of recurrent major depressive disorder (HCC)    Acne vulgaris         Past Medical History:  Past Medical History:   Diagnosis Date    Allergic     Allergic rhinitis     Anesthesia complication     woke up in the middle of wisdom teeth removal    Anxiety 02/20/2018    Asthma     Asthma     Depression     Functional murmur 11/19/2012    History of back pain     History of depression     History of impacted cerumen     unspecified laterality    History of pharyngitis     History of streptococcal pharyngitis     History of tonsillitis     History of URI (upper respiratory infection)     Obesity 11/19/2012    Recurrent major depressive disorder, in partial remission (HCC) 02/20/2018    Seasonal allergies     Sinus problem     Toe fracture, left 02/18/2024    5th toe    Tooth wear     gem with dental bond-upper left    Viral illness 11/02/2023       Past Surgical History:  Past Surgical History:   Procedure Laterality Date    ORIF FOOT FRACTURE Left 2/27/2024    Procedure: LEFT FOOT 5TH METATARSAL OPEN REDUCTION WITH INTERNAL FIXATION;  Surgeon: Jamshid Escobar DPM;  Location: [facility];  Service: Podiatry    WISDOM TOOTH EXTRACTION         Family History:  Family History   Problem Relation Age of Onset    Hypothyroidism Mother     Osteoporosis Mother     Breast cancer Mother 50    Thyroid disease Mother     Diabetes Father     Arthritis Father     Asthma Father     Heart defect Other         Cardiac disorder    Mental illness Neg Hx         Mother       Social History:  Social History     Socioeconomic History    Marital status: Single     Spouse name: Not on file    Number of children: Not on file    Years of education: Not on file    Highest  education level: Not on file   Occupational History    Not on file   Tobacco Use    Smoking status: Former     Types: Cigarettes    Smokeless tobacco: Never    Tobacco comments:     History social smoking 2012 quit   Vaping Use    Vaping status: Never Used   Substance and Sexual Activity    Alcohol use: Not Currently    Drug use: Not Currently     Types: Cocaine     Comment: quit 2016    Sexual activity: Yes     Partners: Male     Birth control/protection: None   Other Topics Concern    Not on file   Social History Narrative    Always uses seat belt    Caffeine use    Exercise habits    No living will    No preference on Yazdanism beliefs    Spouse/family support     Social Determinants of Health     Financial Resource Strain: Not on file   Food Insecurity: Not on file   Transportation Needs: Not on file   Physical Activity: Not on file   Stress: Not on file   Social Connections: Not on file   Intimate Partner Violence: Not on file   Housing Stability: Not on file       Objective     Vitals:    08/21/24 1653   BP: 104/80   Pulse: 102   Resp: 16   Temp: 98.3 °F (36.8 °C)   SpO2: 97%     Wt Readings from Last 3 Encounters:   08/21/24 93.8 kg (206 lb 12.8 oz)   06/12/24 96.6 kg (213 lb)   05/20/24 98.4 kg (217 lb)       Physical Exam  Vitals and nursing note reviewed.   Constitutional:       Appearance: Normal appearance. She is obese.   HENT:      Head: Normocephalic and atraumatic.      Right Ear: Tympanic membrane, ear canal and external ear normal.      Left Ear: Tympanic membrane, ear canal and external ear normal.      Nose: Nose normal.      Mouth/Throat:      Mouth: Mucous membranes are moist.      Pharynx: Oropharynx is clear.   Eyes:      Conjunctiva/sclera: Conjunctivae normal.   Neck:      Thyroid: No thyroid mass, thyromegaly or thyroid tenderness.   Cardiovascular:      Rate and Rhythm: Normal rate and regular rhythm.      Pulses: Normal pulses.      Heart sounds: Normal heart sounds.   Pulmonary:       "Effort: Pulmonary effort is normal.      Breath sounds: Normal breath sounds.   Abdominal:      General: Bowel sounds are normal.   Musculoskeletal:         General: Normal range of motion.      Cervical back: Normal range of motion and neck supple.   Skin:     General: Skin is warm.      Capillary Refill: Capillary refill takes less than 2 seconds.   Neurological:      General: No focal deficit present.      Mental Status: She is alert and oriented to person, place, and time.   Psychiatric:         Mood and Affect: Mood normal.         Behavior: Behavior normal.         Pertinent Laboratory/Diagnostic Studies:  Lab Results   Component Value Date    GLUCOSE 94 09/21/2015    BUN 10 04/21/2023    CREATININE 0.70 04/21/2023    CALCIUM 9.4 04/21/2023     09/21/2015    K 4.1 04/21/2023    CO2 23 04/21/2023     04/21/2023     Lab Results   Component Value Date    ALT 58 04/21/2023    AST 34 04/21/2023    ALKPHOS 56 04/21/2023       Lab Results   Component Value Date    WBC 6.96 05/27/2021    HGB 14.6 05/27/2021    HCT 43.7 05/27/2021    MCV 93 05/27/2021     05/27/2021       No results found for: \"TSH\"    No results found for: \"CHOL\"  Lab Results   Component Value Date    TRIG 231 (H) 05/18/2024     Lab Results   Component Value Date    HDL 62 05/18/2024     Lab Results   Component Value Date    LDLCALC 86 05/18/2024     No results found for: \"HGBA1C\"    Results for orders placed or performed in visit on 05/18/24   Helix Molecular Screen    Specimen: Blood   Result Value Ref Range    PULIDO SYNDROME DNA ANALYSIS Not Detected     HEREDITARY BREAST & OVARIAN CANCER DNA ANALYSIS Not Detected     FAMILIAL HYPERCHOLESTEROLEMIA DNA ANALYSIS Not Detected    Lipid Panel with Direct LDL reflex   Result Value Ref Range    Cholesterol 194 See Comment mg/dL    Triglycerides 231 (H) See Comment mg/dL    HDL, Direct 62 >=50 mg/dL    LDL Calculated 86 0 - 100 mg/dL       No orders of the defined types were placed in " this encounter.      ALLERGIES:  Allergies   Allergen Reactions    Pineapple - Food Allergy Anaphylaxis     Itching/closing of the throat    Amoxicillin GI Intolerance    Mold Extract [Trichophyton] Allergic Rhinitis    Seasonal Ic [Cholestatin] Allergic Rhinitis       Current Medications     Current Outpatient Medications   Medication Sig Dispense Refill    albuterol (Ventolin HFA) 90 mcg/act inhaler Inhale 2 puffs every 4 (four) hours as needed for wheezing 18 g 5    benzoyl peroxide 5 % external liquid Apply topically 2 (two) times a day 236 mL 5    clindamycin 1 % gel Apply topically 2 (two) times a day 60 g 5    Clocortolone Pivalate (Cloderm) 0.1 % cream Apply topically 2 (two) times a day To affected area 45 g 0    fexofenadine (ALLEGRA) 180 MG tablet Take by mouth      FLUoxetine (PROzac) 20 MG tablet Take 1 tablet (20 mg total) by mouth daily 90 tablet 3    MAGNESIUM PO Take by mouth daily at bedtime      methylPREDNISolone 4 MG tablet therapy pack Use as directed on package 21 each 0    mometasone (Asmanex, 30 Metered Doses,) 110 mcg/actuation AEPB inhaler Inhale 2 puffs every evening Rinse mouth after use. 1 each 1    Multiple Vitamin (multivitamin) capsule Take 1 capsule by mouth daily      Omega-3 Fatty Acids (FISH OIL PO) Take by mouth daily at bedtime      ondansetron (ZOFRAN) 4 mg tablet Take 1 tablet (4 mg total) by mouth every 6 (six) hours 12 tablet 0    Semaglutide-Weight Management (Wegovy) 1.7 MG/0.75ML Inject 1.7 mg under the skin weekly 3 mL 5    Elderberry 500 MG CAPS Take by mouth (Patient not taking: Reported on 6/12/2024)       No current facility-administered medications for this visit.         Health Maintenance     Health Maintenance   Topic Date Due    HIV Screening  Never done    COVID-19 Vaccine (4 - 2023-24 season) 09/01/2023    Annual Physical  01/18/2024    Influenza Vaccine (1) 09/01/2024    Hepatitis C Screening  06/13/2026 (Originally 1995)    Depression Screening   06/12/2025    DTaP,Tdap,and Td Vaccines (3 - Td or Tdap) 02/18/2026    Cervical Cancer Screening  12/08/2027    Zoster Vaccine (1 of 2) 05/06/2044    RSV Vaccine Age 60+ Years (1 - 1-dose 60+ series) 05/06/2054    Pneumococcal Vaccine: Pediatrics (0 to 5 Years) and At-Risk Patients (6 to 64 Years)  Completed    HPV Vaccine  Completed    RSV Vaccine age 0-20 Months  Aged Out    HIB Vaccine  Aged Out    IPV Vaccine  Aged Out    Hepatitis A Vaccine  Aged Out    Meningococcal ACWY Vaccine  Aged Out     Immunization History   Administered Date(s) Administered    COVID-19 MODERNA VACC 0.5 ML IM 02/12/2021, 03/12/2021, 12/16/2021    H1N1, All Formulations 10/31/2009    HPV Quadrivalent 08/24/2010, 10/26/2010, 02/03/2011    INFLUENZA 09/25/2015, 10/15/2016, 08/15/2019, 10/29/2020    Influenza Quadrivalent, 6-35 Months IM 10/15/2016    Influenza, injectable, quadrivalent, preservative free 0.5 mL 10/19/2018, 10/04/2019, 09/15/2021, 10/06/2022    Influenza, recombinant, quadrivalent,injectable, preservative free 10/29/2020    Influenza, seasonal, injectable 09/25/2015    Meningococcal, Unknown Serogroups 08/01/2008    Pneumococcal Conjugate Vaccine 20-valent (Pcv20), Polysace 01/18/2023    Tdap 08/01/2008, 02/18/2016          ISAIAS Saravia

## 2024-09-03 ENCOUNTER — TELEPHONE (OUTPATIENT)
Age: 29
End: 2024-09-03

## 2024-09-03 DIAGNOSIS — Z30.09 BIRTH CONTROL COUNSELING: Primary | ICD-10-CM

## 2024-09-03 RX ORDER — NORETHINDRONE ACETATE AND ETHINYL ESTRADIOL 1MG-20(21)
1 KIT ORAL DAILY
Qty: 84 TABLET | Refills: 1 | Status: SHIPPED | OUTPATIENT
Start: 2024-09-03

## 2024-09-03 NOTE — TELEPHONE ENCOUNTER
Pt called in requesting to restart her Junel. States she stopped it in January, she has been getting her period which she does not want. Pt would like to restart, she would be using [facility] pharmacy. Advised will review with Dr. Hines. Pt thankful.

## 2024-09-17 DIAGNOSIS — E66.01 CLASS 2 SEVERE OBESITY DUE TO EXCESS CALORIES WITH SERIOUS COMORBIDITY AND BODY MASS INDEX (BMI) OF 39.0 TO 39.9 IN ADULT (HCC): ICD-10-CM

## 2024-09-18 NOTE — TELEPHONE ENCOUNTER
Requested medication(s) are due for refill today: Yes  Patient has already received a courtesy refill: No  Other reason request has been forwarded to provider: Stay at current dose

## 2024-09-21 RX ORDER — SEMAGLUTIDE 1.7 MG/.75ML
INJECTION, SOLUTION SUBCUTANEOUS
Qty: 3 ML | Refills: 0 | Status: SHIPPED | OUTPATIENT
Start: 2024-09-21

## 2024-10-21 DIAGNOSIS — E66.812 CLASS 2 SEVERE OBESITY DUE TO EXCESS CALORIES WITH SERIOUS COMORBIDITY AND BODY MASS INDEX (BMI) OF 39.0 TO 39.9 IN ADULT (HCC): Primary | ICD-10-CM

## 2024-10-21 DIAGNOSIS — E66.01 CLASS 2 SEVERE OBESITY DUE TO EXCESS CALORIES WITH SERIOUS COMORBIDITY AND BODY MASS INDEX (BMI) OF 39.0 TO 39.9 IN ADULT (HCC): Primary | ICD-10-CM

## 2024-10-21 RX ORDER — SEMAGLUTIDE 2.4 MG/.75ML
INJECTION, SOLUTION SUBCUTANEOUS
Qty: 3 ML | Refills: 5 | Status: SHIPPED | OUTPATIENT
Start: 2024-10-21

## 2024-10-30 DIAGNOSIS — E66.812 CLASS 2 SEVERE OBESITY DUE TO EXCESS CALORIES WITH SERIOUS COMORBIDITY AND BODY MASS INDEX (BMI) OF 39.0 TO 39.9 IN ADULT (HCC): ICD-10-CM

## 2024-10-30 DIAGNOSIS — E66.01 CLASS 2 SEVERE OBESITY DUE TO EXCESS CALORIES WITH SERIOUS COMORBIDITY AND BODY MASS INDEX (BMI) OF 39.0 TO 39.9 IN ADULT (HCC): ICD-10-CM

## 2024-10-30 RX ORDER — SEMAGLUTIDE 2.4 MG/.75ML
INJECTION, SOLUTION SUBCUTANEOUS
Qty: 3 ML | Refills: 5 | Status: SHIPPED | OUTPATIENT
Start: 2024-10-30

## 2024-10-30 NOTE — TELEPHONE ENCOUNTER
Reason for call:   Patient states she called the pharm and they told her they never recvd the refill request    [x] Refill   [] Prior Auth  [] Other:     Office:   [x] PCP/Provider - Ana Batista  [] Specialty/Provider -     Medication:   Wegovy 2.4 mg        Pharmacy:   [facility]    Does the patient have enough for 3 days?   [] Yes   [x] No - Send as HP to POD

## 2024-10-31 ENCOUNTER — TELEPHONE (OUTPATIENT)
Age: 29
End: 2024-10-31

## 2024-10-31 NOTE — TELEPHONE ENCOUNTER
Patient was called and made aware of denial, patient stated she is aware and she pays out of pocket for wegovy.

## 2024-10-31 NOTE — TELEPHONE ENCOUNTER
PA for Wegovy 2.4 mg /0.75 SUBMITTED     via    []CMM-KEY:   [x]Surescripts-Case ID # [id number]   []Availity-Auth ID # NDC #   []Faxed to plan   []Other website   []Phone call Case ID #     Office notes sent, clinical questions answered. Awaiting determination    Turnaround time for your insurance to make a decision on your Prior Authorization can take 7-21 business days.

## 2024-10-31 NOTE — TELEPHONE ENCOUNTER
PA for WEGOVY 2.4MG- DENIED    Reason:(Screenshot if applicable)        Message sent to office clinical pool Yes    Denial letter scanned into Media No WAITING FOR FAX    Appeal started No (Provider will need to decide if appeal is warranted and send clinical documentation to Prior Authorization Team for initiation.)    **Please follow up with your patient regarding denial and next steps**

## 2024-11-20 ENCOUNTER — OFFICE VISIT (OUTPATIENT)
Dept: FAMILY MEDICINE CLINIC | Facility: CLINIC | Age: 29
End: 2024-11-20
Payer: COMMERCIAL

## 2024-11-20 VITALS
SYSTOLIC BLOOD PRESSURE: 120 MMHG | HEART RATE: 84 BPM | WEIGHT: 196.6 LBS | OXYGEN SATURATION: 98 % | HEIGHT: 61 IN | DIASTOLIC BLOOD PRESSURE: 78 MMHG | TEMPERATURE: 97.3 F | RESPIRATION RATE: 16 BRPM | BODY MASS INDEX: 37.12 KG/M2

## 2024-11-20 DIAGNOSIS — E66.812 CLASS 2 OBESITY DUE TO EXCESS CALORIES WITHOUT SERIOUS COMORBIDITY WITH BODY MASS INDEX (BMI) OF 37.0 TO 37.9 IN ADULT: ICD-10-CM

## 2024-11-20 DIAGNOSIS — E66.09 CLASS 2 OBESITY DUE TO EXCESS CALORIES WITHOUT SERIOUS COMORBIDITY WITH BODY MASS INDEX (BMI) OF 37.0 TO 37.9 IN ADULT: ICD-10-CM

## 2024-11-20 DIAGNOSIS — J45.20 MILD INTERMITTENT ASTHMA WITHOUT COMPLICATION: Primary | ICD-10-CM

## 2024-11-20 PROCEDURE — 99213 OFFICE O/P EST LOW 20 MIN: CPT | Performed by: NURSE PRACTITIONER

## 2024-11-20 NOTE — PROGRESS NOTES
FAMILY PRACTICE OFFICE VISIT       NAME: LYNDON Reed  AGE: 29 y.o. SEX: female       : 1995        MRN: [id number]    DATE: 2024  TIME: 4:56 PM    Assessment and Plan   1. Mild intermittent asthma without complication  Assessment & Plan:  Stable  Cont meds prn    2. Class 2 obesity due to excess calories without serious comorbidity with body mass index (BMI) of 37.0 to 37.9 in adult  Assessment & Plan:    Cont meds  Cont diet and exercise         There are no Patient Instructions on file for this visit.        Chief Complaint     Chief Complaint   Patient presents with    Follow-up     Patient being seen for a 3 month follow up visit       History of Present Illness   LYNDON Reed is a 29 y.o.-year-old female who is here for f/u to chronic medical conditions  Doing well on meds  Lost over 20 pounds   Tolerating meds well      Review of Systems   Review of Systems   Constitutional:  Negative for fatigue and fever.   HENT:  Negative for congestion, postnasal drip and rhinorrhea.    Respiratory:  Negative for cough, shortness of breath and wheezing.    Cardiovascular:  Negative for chest pain and palpitations.   Gastrointestinal:  Negative for constipation, diarrhea, nausea and vomiting.   Genitourinary:  Negative for dysuria and frequency.   Musculoskeletal:  Negative for arthralgias and myalgias.   Skin:  Negative for rash.   Neurological:  Negative for dizziness, light-headedness and headaches.   Hematological:  Negative for adenopathy.   Psychiatric/Behavioral:  Negative for dysphoric mood and sleep disturbance. The patient is not nervous/anxious.        Active Problem List     Patient Active Problem List   Diagnosis    Anxiety    Insomnia    Functional murmur    Obesity    Flexural atopic dermatitis    Mild intermittent asthma without complication    Mild episode of recurrent major depressive disorder (HCC)    Acne vulgaris         Past Medical History:  Past Medical History:   Diagnosis  Date    Allergic     Allergic rhinitis     Anesthesia complication     woke up in the middle of wisdom teeth removal    Anxiety 02/20/2018    Asthma     Asthma     Depression     Functional murmur 11/19/2012    History of back pain     History of depression     History of impacted cerumen     unspecified laterality    History of pharyngitis     History of streptococcal pharyngitis     History of tonsillitis     History of URI (upper respiratory infection)     Obesity 11/19/2012    Recurrent major depressive disorder, in partial remission (HCC) 02/20/2018    Seasonal allergies     Sinus problem     Toe fracture, left 02/18/2024    5th toe    Tooth wear     gem with dental bond-upper left    Viral illness 11/02/2023       Past Surgical History:  Past Surgical History:   Procedure Laterality Date    ORIF FOOT FRACTURE Left 2/27/2024    Procedure: LEFT FOOT 5TH METATARSAL OPEN REDUCTION WITH INTERNAL FIXATION;  Surgeon: Jamshid Escobar DPM;  Location: [facility];  Service: Podiatry    WISDOM TOOTH EXTRACTION         Family History:  Family History   Problem Relation Age of Onset    Hypothyroidism Mother     Osteoporosis Mother     Breast cancer Mother 50    Thyroid disease Mother     Diabetes Father     Arthritis Father     Asthma Father     Heart defect Other         Cardiac disorder    Mental illness Neg Hx         Mother       Social History:  Social History     Socioeconomic History    Marital status: Single     Spouse name: Not on file    Number of children: Not on file    Years of education: Not on file    Highest education level: Not on file   Occupational History    Not on file   Tobacco Use    Smoking status: Former     Types: Cigarettes    Smokeless tobacco: Never    Tobacco comments:     History social smoking 2012 quit   Vaping Use    Vaping status: Never Used   Substance and Sexual Activity    Alcohol use: Not Currently    Drug use: Not Currently     Types: Cocaine     Comment: quit 2016    Sexual activity:  Yes     Partners: Male     Birth control/protection: None   Other Topics Concern    Not on file   Social History Narrative    Always uses seat belt    Caffeine use    Exercise habits    No living will    No preference on Mormonism beliefs    Spouse/family support     Social Drivers of Health     Financial Resource Strain: Not on file   Food Insecurity: Not on file   Transportation Needs: Not on file   Physical Activity: Not on file   Stress: Not on file   Social Connections: Not on file   Intimate Partner Violence: Not on file   Housing Stability: Not on file       Objective     Vitals:    11/20/24 1554   BP: 120/78   Pulse: 84   Resp: 16   Temp: (!) 97.3 °F (36.3 °C)   SpO2: 98%     Wt Readings from Last 3 Encounters:   11/20/24 89.2 kg (196 lb 9.6 oz)   08/21/24 93.8 kg (206 lb 12.8 oz)   06/12/24 96.6 kg (213 lb)       Physical Exam  Vitals and nursing note reviewed.   Constitutional:       Appearance: Normal appearance.   HENT:      Head: Normocephalic and atraumatic.      Right Ear: Tympanic membrane, ear canal and external ear normal.      Left Ear: Tympanic membrane, ear canal and external ear normal.      Nose: Nose normal.      Mouth/Throat:      Mouth: Mucous membranes are moist.   Eyes:      Conjunctiva/sclera: Conjunctivae normal.   Cardiovascular:      Rate and Rhythm: Normal rate and regular rhythm.      Heart sounds: Normal heart sounds.   Pulmonary:      Effort: Pulmonary effort is normal.      Breath sounds: Normal breath sounds.   Abdominal:      General: Bowel sounds are normal.      Palpations: Abdomen is soft.   Musculoskeletal:         General: Normal range of motion.      Cervical back: Normal range of motion and neck supple.   Skin:     General: Skin is warm and dry.      Capillary Refill: Capillary refill takes less than 2 seconds.   Neurological:      General: No focal deficit present.      Mental Status: She is alert and oriented to person, place, and time.   Psychiatric:         Mood and  "Affect: Mood normal.         Behavior: Behavior normal.         Thought Content: Thought content normal.         Judgment: Judgment normal.         Pertinent Laboratory/Diagnostic Studies:  Lab Results   Component Value Date    GLUCOSE 94 09/21/2015    BUN 10 04/21/2023    CREATININE 0.70 04/21/2023    CALCIUM 9.4 04/21/2023     09/21/2015    K 4.1 04/21/2023    CO2 23 04/21/2023     04/21/2023     Lab Results   Component Value Date    ALT 58 04/21/2023    AST 34 04/21/2023    ALKPHOS 56 04/21/2023       Lab Results   Component Value Date    WBC 6.96 05/27/2021    HGB 14.6 05/27/2021    HCT 43.7 05/27/2021    MCV 93 05/27/2021     05/27/2021       No results found for: \"TSH\"    No results found for: \"CHOL\"  Lab Results   Component Value Date    TRIG 231 (H) 05/18/2024     Lab Results   Component Value Date    HDL 62 05/18/2024     Lab Results   Component Value Date    LDLCALC 86 05/18/2024     No results found for: \"HGBA1C\"    Results for orders placed or performed in visit on 05/18/24   Lipid Panel with Direct LDL reflex    Collection Time: 05/18/24 11:45 AM   Result Value Ref Range    Cholesterol 194 See Comment mg/dL    Triglycerides 231 (H) See Comment mg/dL    HDL, Direct 62 >=50 mg/dL    LDL Calculated 86 0 - 100 mg/dL   Helix Molecular Screen    Collection Time: 05/18/24 12:05 PM    Specimen: Blood   Result Value Ref Range    PULIDO SYNDROME DNA ANALYSIS Not Detected     HEREDITARY BREAST & OVARIAN CANCER DNA ANALYSIS Not Detected     FAMILIAL HYPERCHOLESTEROLEMIA DNA ANALYSIS Not Detected        No orders of the defined types were placed in this encounter.      ALLERGIES:  Allergies   Allergen Reactions    Pineapple - Food Allergy Anaphylaxis     Itching/closing of the throat    Amoxicillin GI Intolerance    Mold Extract [Trichophyton] Allergic Rhinitis    Seasonal Ic [Cholestatin] Allergic Rhinitis       Current Medications     Current Outpatient Medications   Medication Sig Dispense " Refill    albuterol (Ventolin HFA) 90 mcg/act inhaler Inhale 2 puffs every 4 (four) hours as needed for wheezing 18 g 5    benzoyl peroxide 5 % external liquid Apply topically 2 (two) times a day 236 mL 5    clindamycin 1 % gel Apply topically 2 (two) times a day 60 g 5    Clocortolone Pivalate (Cloderm) 0.1 % cream Apply topically 2 (two) times a day To affected area 45 g 0    fexofenadine (ALLEGRA) 180 MG tablet Take by mouth      FLUoxetine (PROzac) 20 MG tablet Take 1 tablet (20 mg total) by mouth daily 90 tablet 3    MAGNESIUM PO Take by mouth daily at bedtime      methylPREDNISolone 4 MG tablet therapy pack Use as directed on package 21 each 0    mometasone (Asmanex, 30 Metered Doses,) 110 mcg/actuation AEPB inhaler Inhale 2 puffs every evening Rinse mouth after use. 1 each 1    Multiple Vitamin (multivitamin) capsule Take 1 capsule by mouth daily      norethindrone-ethinyl estradiol (Loestrin Fe 1/20) 1-20 MG-MCG per tablet Take 1 tablet by mouth daily 84 tablet 1    Omega-3 Fatty Acids (FISH OIL PO) Take by mouth daily at bedtime      ondansetron (ZOFRAN) 4 mg tablet Take 1 tablet (4 mg total) by mouth every 6 (six) hours 12 tablet 0    Semaglutide-Weight Management (Wegovy) 2.4 MG/0.75ML Inject 2.4 mg under the skin weekly 3 mL 5    Elderberry 500 MG CAPS Take by mouth (Patient not taking: Reported on 11/20/2024)       No current facility-administered medications for this visit.         Health Maintenance     Health Maintenance   Topic Date Due    Annual Physical  01/18/2024    Hepatitis C Screening  06/13/2026 (Originally 1995)    HIV Screening  11/20/2026 (Originally 5/6/2009)    Depression Screening  06/12/2025    DTaP,Tdap,and Td Vaccines (3 - Td or Tdap) 02/18/2026    Cervical Cancer Screening  12/08/2027    Zoster Vaccine (1 of 2) 05/06/2044    RSV Vaccine Age 60+ Years (1 - 1-dose 75+ series) 05/06/2069    Pneumococcal Vaccine: Pediatrics (0 to 5 Years) and At-Risk Patients (6 to 64 Years)   Completed    Influenza Vaccine  Completed    HPV Vaccine  Completed    COVID-19 Vaccine  Completed    RSV Vaccine age 0-20 Months  Aged Out    HIB Vaccine  Aged Out    IPV Vaccine  Aged Out    Hepatitis A Vaccine  Aged Out    Meningococcal ACWY Vaccine  Aged Out     Immunization History   Administered Date(s) Administered    COVID-19 MODERNA VACC 0.5 ML IM 02/12/2021, 03/12/2021, 12/16/2021    COVID-19 Pfizer mRNA vacc PF jackie-sucrose 12 yr and older (Comirnaty) 10/09/2024    H1N1, All Formulations 10/31/2009    HPV Quadrivalent 08/24/2010, 10/26/2010, 02/03/2011    INFLUENZA 09/25/2015, 10/15/2016, 08/15/2019, 10/29/2020, 10/09/2024    Influenza Quadrivalent, 6-35 Months IM 10/15/2016    Influenza, injectable, quadrivalent, preservative free 0.5 mL 10/19/2018, 10/04/2019, 09/15/2021, 10/06/2022    Influenza, recombinant, quadrivalent,injectable, preservative free 10/29/2020    Influenza, seasonal, injectable 09/25/2015    Meningococcal, Unknown Serogroups 08/01/2008    Pneumococcal Conjugate Vaccine 20-valent (Pcv20), Polysace 01/18/2023    Tdap 08/01/2008, 02/18/2016          ISAIAS Saravia

## 2024-11-25 ENCOUNTER — APPOINTMENT (OUTPATIENT)
Dept: RADIOLOGY | Age: 29
End: 2024-11-25
Payer: COMMERCIAL

## 2024-11-25 ENCOUNTER — OFFICE VISIT (OUTPATIENT)
Dept: URGENT CARE | Age: 29
End: 2024-11-25
Payer: COMMERCIAL

## 2024-11-25 VITALS
OXYGEN SATURATION: 99 % | BODY MASS INDEX: 35.5 KG/M2 | HEIGHT: 61 IN | TEMPERATURE: 99 F | DIASTOLIC BLOOD PRESSURE: 64 MMHG | RESPIRATION RATE: 18 BRPM | SYSTOLIC BLOOD PRESSURE: 108 MMHG | HEART RATE: 112 BPM | WEIGHT: 188 LBS

## 2024-11-25 DIAGNOSIS — R06.00 DYSPNEA, UNSPECIFIED TYPE: ICD-10-CM

## 2024-11-25 DIAGNOSIS — Z30.09 BIRTH CONTROL COUNSELING: ICD-10-CM

## 2024-11-25 DIAGNOSIS — J45.21 MILD INTERMITTENT ASTHMA WITH EXACERBATION: ICD-10-CM

## 2024-11-25 DIAGNOSIS — R11.0 NAUSEA: Primary | ICD-10-CM

## 2024-11-25 DIAGNOSIS — J40 BRONCHITIS: ICD-10-CM

## 2024-11-25 PROCEDURE — S9083 URGENT CARE CENTER GLOBAL: HCPCS | Performed by: STUDENT IN AN ORGANIZED HEALTH CARE EDUCATION/TRAINING PROGRAM

## 2024-11-25 PROCEDURE — 71046 X-RAY EXAM CHEST 2 VIEWS: CPT

## 2024-11-25 PROCEDURE — 94640 AIRWAY INHALATION TREATMENT: CPT | Performed by: STUDENT IN AN ORGANIZED HEALTH CARE EDUCATION/TRAINING PROGRAM

## 2024-11-25 PROCEDURE — G0382 LEV 3 HOSP TYPE B ED VISIT: HCPCS | Performed by: STUDENT IN AN ORGANIZED HEALTH CARE EDUCATION/TRAINING PROGRAM

## 2024-11-25 RX ORDER — AZITHROMYCIN 250 MG/1
TABLET, FILM COATED ORAL
Qty: 6 TABLET | Refills: 0 | Status: SHIPPED | OUTPATIENT
Start: 2024-11-25 | End: 2024-11-29

## 2024-11-25 RX ORDER — ONDANSETRON 4 MG/1
4 TABLET, FILM COATED ORAL EVERY 8 HOURS PRN
Qty: 15 TABLET | Refills: 0 | Status: SHIPPED | OUTPATIENT
Start: 2024-11-25

## 2024-11-25 RX ORDER — PREDNISONE 20 MG/1
40 TABLET ORAL DAILY
Qty: 10 TABLET | Refills: 0 | Status: SHIPPED | OUTPATIENT
Start: 2024-11-25 | End: 2024-11-30

## 2024-11-25 RX ORDER — ONDANSETRON 4 MG/1
4 TABLET, ORALLY DISINTEGRATING ORAL ONCE
Status: COMPLETED | OUTPATIENT
Start: 2024-11-25 | End: 2024-11-25

## 2024-11-25 RX ORDER — IPRATROPIUM BROMIDE AND ALBUTEROL SULFATE 2.5; .5 MG/3ML; MG/3ML
3 SOLUTION RESPIRATORY (INHALATION) ONCE
Status: COMPLETED | OUTPATIENT
Start: 2024-11-25 | End: 2024-11-25

## 2024-11-25 RX ADMIN — ONDANSETRON 4 MG: 4 TABLET, ORALLY DISINTEGRATING ORAL at 14:16

## 2024-11-25 RX ADMIN — IPRATROPIUM BROMIDE AND ALBUTEROL SULFATE 3 ML: 2.5; .5 SOLUTION RESPIRATORY (INHALATION) at 15:06

## 2024-11-25 NOTE — PATIENT INSTRUCTIONS
On my initial read of your x-ray, I do not see a definite pneumonia.  The radiologist will also read your x-ray, you can follow results your MyChart.  Will call you with any changes to the plan.  I am covering you for a bacterial respiratory infection with azithromycin.  Please take as prescribed.  We are also treating you for your wheezing/tight breathing.  You are given a nebulizer treatment in office today.  I am sending a course of steroids to the pharmacy for you.  You are also given a dose of Zofran to help with the nausea, I am sending more Zofran to the pharmacy for you.  Please focus on good hydration with water, electrolyte beverages such as Gatorade, Pedialyte, liquid IV.  You may start to eat bland gentle foods such as soups, crackers, toast, applesauce, bananas.  I recommend a recheck appoint with your PCP to monitor your symptoms.  If you have any severe worsening symptoms such as unable to keep down fluids, worsening of your breathing despite the treatment above, please go to the ER.

## 2024-11-25 NOTE — PROGRESS NOTES
[facility]        NAME: LYNDON Reed is a 29 y.o. female  : 1995    MRN: [id number]  DATE: 2024  TIME: 3:01 PM    Assessment and Plan   Nausea [R11.0]  1. Nausea  ondansetron (ZOFRAN-ODT) dispersible tablet 4 mg    ondansetron (ZOFRAN) 4 mg tablet      2. Dyspnea, unspecified type  XR chest pa and lateral      3. Mild intermittent asthma with exacerbation  ipratropium-albuterol (DUO-NEB) 0.5-2.5 mg/3 mL inhalation solution 3 mL    azithromycin (ZITHROMAX) 250 mg tablet    predniSONE 20 mg tablet      4. Bronchitis  azithromycin (ZITHROMAX) 250 mg tablet    predniSONE 20 mg tablet            Patient Instructions   On my initial read of your x-ray, I do not see a definite pneumonia.  The radiologist will also read your x-ray, you can follow results your MyChart.  Will call you with any changes to the plan.  I am covering you for a bacterial respiratory infection with azithromycin.  Please take as prescribed.  We are also treating you for your wheezing/tight breathing.  You are given a nebulizer treatment in office today.  I am sending a course of steroids to the pharmacy for you.  You are also given a dose of Zofran to help with the nausea, I am sending more Zofran to the pharmacy for you.  Please focus on good hydration with water, electrolyte beverages such as Gatorade, Pedialyte, liquid IV.  You may start to eat bland gentle foods such as soups, crackers, toast, applesauce, bananas.  I recommend a recheck appoint with your PCP to monitor your symptoms.  If you have any severe worsening symptoms such as unable to keep down fluids, worsening of your breathing despite the treatment above, please go to the ER.    Follow up with PCP in 3-5 days.  Proceed to  ER if symptoms worsen.    If tests have been performed at [facility], our office will contact you with results if changes need to be made to the care plan discussed with you at the visit.  You can review your full results on [facility] MyChart.    Chief Complaint     Chief Complaint   Patient presents with    Cough    Fever     Pt states sinus congestion and cough began last Thursday. Fever and vomiting began today. Has taken nyquil, mucinex, advil sinus. Has needed rescue inhaler; last evening. Anorexia for past 24 hours.          History of Present Illness       Patient presents for evaluation of symptoms that started out 5 days ago.  Initially started with nasal and sinus congestion, cough.  She also developed wheezing and tight breathing.  Has been using her inhalers which helped for a little bit but then symptoms come back.  Over the last day she also started to experience intermittent nausea, vomiting, NBNB, loss of appetite.  Elevated temp starting today.  No diarrhea.  Feels nausea but no abdominal pain.  Denies chance of pregnancy.    Cough    Fever  Associated symptoms include coughing.       Review of Systems   Review of Systems   Respiratory:  Positive for cough.    All other systems reviewed and are negative.        Current Medications       Current Outpatient Medications:     albuterol (Ventolin HFA) 90 mcg/act inhaler, Inhale 2 puffs every 4 (four) hours as needed for wheezing, Disp: 18 g, Rfl: 5    azithromycin (ZITHROMAX) 250 mg tablet, Take 2 tablets today then 1 tablet daily x 4 days, Disp: 6 tablet, Rfl: 0    benzoyl peroxide 5 % external liquid, Apply topically 2 (two) times a day, Disp: 236 mL, Rfl: 5    clindamycin 1 % gel, Apply topically 2 (two) times a day, Disp: 60 g, Rfl: 5    Clocortolone Pivalate (Cloderm) 0.1 % cream, Apply topically 2 (two) times a day To affected area, Disp: 45 g, Rfl: 0    fexofenadine (ALLEGRA) 180 MG tablet, Take by mouth, Disp: , Rfl:     FLUoxetine (PROzac) 20 MG tablet, Take 1 tablet (20 mg total) by mouth daily, Disp: 90 tablet, Rfl: 3    MAGNESIUM PO, Take by mouth daily at bedtime, Disp: , Rfl:     mometasone (Asmanex, 30 Metered Doses,) 110 mcg/actuation AEPB inhaler, Inhale 2  puffs every evening Rinse mouth after use., Disp: 1 each, Rfl: 1    Multiple Vitamin (multivitamin) capsule, Take 1 capsule by mouth daily, Disp: , Rfl:     norethindrone-ethinyl estradiol (Loestrin Fe 1/20) 1-20 MG-MCG per tablet, Take 1 tablet by mouth daily, Disp: 84 tablet, Rfl: 1    ondansetron (ZOFRAN) 4 mg tablet, Take 1 tablet (4 mg total) by mouth every 6 (six) hours, Disp: 12 tablet, Rfl: 0    ondansetron (ZOFRAN) 4 mg tablet, Take 1 tablet (4 mg total) by mouth every 8 (eight) hours as needed for nausea or vomiting, Disp: 15 tablet, Rfl: 0    predniSONE 20 mg tablet, Take 2 tablets (40 mg total) by mouth daily for 5 days, Disp: 10 tablet, Rfl: 0    Semaglutide-Weight Management (Wegovy) 2.4 MG/0.75ML, Inject 2.4 mg under the skin weekly, Disp: 3 mL, Rfl: 5    Elderberry 500 MG CAPS, Take by mouth (Patient not taking: Reported on 11/20/2024), Disp: , Rfl:     Omega-3 Fatty Acids (FISH OIL PO), Take by mouth daily at bedtime (Patient not taking: Reported on 11/25/2024), Disp: , Rfl:     Current Facility-Administered Medications:     ipratropium-albuterol (DUO-NEB) 0.5-2.5 mg/3 mL inhalation solution 3 mL, 3 mL, Nebulization, Once,     Current Allergies     Allergies as of 11/25/2024 - Reviewed 11/25/2024   Allergen Reaction Noted    Pineapple - food allergy Anaphylaxis 02/22/2024    Amoxicillin GI Intolerance 09/04/2021    Mold extract [trichophyton] Allergic Rhinitis 04/01/2015    Seasonal ic [cholestatin] Allergic Rhinitis 10/04/2019            The following portions of the patient's history were reviewed and updated as appropriate: allergies, current medications, past family history, past medical history, past social history, past surgical history and problem list.     Past Medical History:   Diagnosis Date    Allergic     Allergic rhinitis     Anesthesia complication     woke up in the middle of wisdom teeth removal    Anxiety 02/20/2018    Asthma     Asthma     Depression     Functional murmur  "11/19/2012    History of back pain     History of depression     History of impacted cerumen     unspecified laterality    History of pharyngitis     History of streptococcal pharyngitis     History of tonsillitis     History of URI (upper respiratory infection)     Obesity 11/19/2012    Recurrent major depressive disorder, in partial remission ([facility]) 02/20/2018    Seasonal allergies     Sinus problem     Toe fracture, left 02/18/2024    5th toe    Tooth wear     gem with dental bond-upper left    Viral illness 11/02/2023       Past Surgical History:   Procedure Laterality Date    ORIF FOOT FRACTURE Left 2/27/2024    Procedure: LEFT FOOT 5TH METATARSAL OPEN REDUCTION WITH INTERNAL FIXATION;  Surgeon: Jamshid Escobar DPM;  Location: [facility];  Service: Podiatry    WISDOM TOOTH EXTRACTION         Family History   Problem Relation Age of Onset    Hypothyroidism Mother     Osteoporosis Mother     Breast cancer Mother 50    Thyroid disease Mother     Diabetes Father     Arthritis Father     Asthma Father     Heart defect Other         Cardiac disorder    Mental illness Neg Hx         Mother         Medications have been verified.        Objective   /64   Pulse (!) 112   Temp 99 °F (37.2 °C)   Resp 18   Ht 5' 1\" (1.549 m)   Wt 85.3 kg (188 lb)   SpO2 99%   BMI 35.52 kg/m²   No LMP recorded.       Physical Exam     Physical Exam  Vitals and nursing note reviewed.   Constitutional:       General: She is not in acute distress.     Appearance: Normal appearance. She is not ill-appearing or toxic-appearing.   HENT:      Head: Normocephalic and atraumatic.      Right Ear: Tympanic membrane, ear canal and external ear normal.      Left Ear: Tympanic membrane, ear canal and external ear normal.      Nose: Congestion present.      Mouth/Throat:      Mouth: Mucous membranes are moist.      Pharynx: No posterior oropharyngeal erythema.   Eyes:      Extraocular Movements: Extraocular movements intact.   Cardiovascular: "      Rate and Rhythm: Normal rate and regular rhythm.      Heart sounds: Normal heart sounds.   Pulmonary:      Effort: Pulmonary effort is normal. No respiratory distress.      Breath sounds: Normal breath sounds. No stridor. No rhonchi or rales.      Comments: Tight breathing b/l  Decreased R base  Bilateral bases with rhonchi  Abdominal:      General: Bowel sounds are normal.      Tenderness: There is no abdominal tenderness. There is no guarding or rebound.   Skin:     General: Skin is warm and dry.      Capillary Refill: Capillary refill takes less than 2 seconds.      Findings: No rash.   Neurological:      Mental Status: She is alert.      Gait: Gait normal.   Psychiatric:         Behavior: Behavior normal.                 '

## 2024-11-25 NOTE — TELEPHONE ENCOUNTER
Reason for call:   [x] Refill   [] Prior Auth  [] Other:     Office:   [] PCP/Provider -   [x] Specialty/Provider - obgyn     Medication: norethindrone-ethinyl estradiol (Loestrin Fe 1/20) 1-20 MG-MCG per tablet     Dose/Frequency: Take 1 tablet by mouth daily,     Quantity:  84 tablet     Pharmacy: : [facility] - [place] - [address] [place] [address] [address]     Does the patient have enough for 3 days?   [] Yes   [x] No - Send as HP to POD

## 2024-11-25 NOTE — LETTER
November 25, 2024     Patient: LYNDON Reed   YOB: 1995   Date of Visit: 11/25/2024       To Whom it May Concern:    LYNDON Reed was seen in my clinic on 11/25/2024.  Please excuse her from work today 11/25, 11/26, 11/27.      If you have any questions or concerns, please don't hesitate to call.         Sincerely,          Victorina Zaragoza, DO

## 2024-11-26 RX ORDER — NORETHINDRONE ACETATE AND ETHINYL ESTRADIOL 1MG-20(21)
1 KIT ORAL DAILY
Qty: 84 TABLET | Refills: 0 | Status: SHIPPED | OUTPATIENT
Start: 2024-11-26

## 2024-12-19 ENCOUNTER — OFFICE VISIT (OUTPATIENT)
Dept: FAMILY MEDICINE CLINIC | Facility: CLINIC | Age: 29
End: 2024-12-19
Payer: COMMERCIAL

## 2024-12-19 VITALS
HEART RATE: 78 BPM | HEIGHT: 61 IN | BODY MASS INDEX: 37 KG/M2 | DIASTOLIC BLOOD PRESSURE: 80 MMHG | WEIGHT: 196 LBS | RESPIRATION RATE: 18 BRPM | SYSTOLIC BLOOD PRESSURE: 118 MMHG | TEMPERATURE: 98.7 F | OXYGEN SATURATION: 99 %

## 2024-12-19 DIAGNOSIS — H69.93 EUSTACHIAN TUBE DYSFUNCTION, BILATERAL: Primary | ICD-10-CM

## 2024-12-19 PROCEDURE — 99213 OFFICE O/P EST LOW 20 MIN: CPT | Performed by: NURSE PRACTITIONER

## 2024-12-19 RX ORDER — METHYLPREDNISOLONE 4 MG/1
TABLET ORAL
Qty: 21 EACH | Refills: 0 | Status: SHIPPED | OUTPATIENT
Start: 2024-12-19

## 2024-12-19 NOTE — PROGRESS NOTES
FAMILY PRACTICE OFFICE VISIT       NAME: LYNDON Reed  AGE: 29 y.o. SEX: female       : 1995        MRN: [id number]    DATE: 2024  TIME: 5:57 PM    Assessment and Plan   1. Eustachian tube dysfunction, bilateral  -     methylPREDNISolone 4 MG tablet therapy pack; Use as directed on package       There are no Patient Instructions on file for this visit.        Chief Complaint     Chief Complaint   Patient presents with    Earache     Pt being seen for earache x 6 weeks       History of Present Illness   LYNDON Reed is a 29 y.o.-year-old female who is here for c/o ear problems  Having ear popping  Pressure  Decreased hearing  Recent pneumonia/bronchitis      Review of Systems   Review of Systems   Constitutional:  Negative for fatigue and fever.   HENT:  Negative for congestion, ear discharge, ear pain, postnasal drip and rhinorrhea.    Respiratory:  Negative for cough, shortness of breath and wheezing.    Cardiovascular:  Negative for chest pain and palpitations.   Gastrointestinal:  Negative for constipation, diarrhea, nausea and vomiting.   Genitourinary:  Negative for dysuria and frequency.   Musculoskeletal:  Negative for arthralgias and myalgias.   Skin:  Negative for rash.   Neurological:  Negative for light-headedness and headaches.   Hematological:  Negative for adenopathy.   Psychiatric/Behavioral:  Negative for dysphoric mood and sleep disturbance. The patient is not nervous/anxious.    BMI Counseling: Body mass index is 37.03 kg/m². The BMI is above normal. Nutrition recommendations include reducing portion sizes, decreasing overall calorie intake, 3-5 servings of fruits/vegetables daily, reducing fast food intake, consuming healthier snacks, decreasing soda and/or juice intake, moderation in carbohydrate intake, increasing intake of lean protein, reducing intake of saturated fat and trans fat, and reducing intake of cholesterol. Exercise recommendations include moderate aerobic  physical activity for 150 minutes/week, exercising 3-5 times per week, and strength training exercises.     Active Problem List     Patient Active Problem List   Diagnosis    Anxiety    Insomnia    Functional murmur    Obesity    Flexural atopic dermatitis    Mild intermittent asthma without complication    Mild episode of recurrent major depressive disorder (HCC)    Acne vulgaris    Eustachian tube dysfunction, bilateral         Past Medical History:  Past Medical History:   Diagnosis Date    Allergic     Allergic rhinitis     Anesthesia complication     woke up in the middle of wisdom teeth removal    Anxiety 02/20/2018    Asthma     Asthma     Depression     Functional murmur 11/19/2012    History of back pain     History of depression     History of impacted cerumen     unspecified laterality    History of pharyngitis     History of streptococcal pharyngitis     History of tonsillitis     History of URI (upper respiratory infection)     Obesity 11/19/2012    Recurrent major depressive disorder, in partial remission (HCC) 02/20/2018    Seasonal allergies     Sinus problem     Toe fracture, left 02/18/2024    5th toe    Tooth wear     gem with dental bond-upper left    Viral illness 11/02/2023       Past Surgical History:  Past Surgical History:   Procedure Laterality Date    ORIF FOOT FRACTURE Left 2/27/2024    Procedure: LEFT FOOT 5TH METATARSAL OPEN REDUCTION WITH INTERNAL FIXATION;  Surgeon: Jamshid Escobar DPM;  Location: [facility];  Service: Podiatry    WISDOM TOOTH EXTRACTION         Family History:  Family History   Problem Relation Age of Onset    Hypothyroidism Mother     Osteoporosis Mother     Breast cancer Mother 50    Thyroid disease Mother     Diabetes Father     Arthritis Father     Asthma Father     Heart defect Other         Cardiac disorder    Mental illness Neg Hx         Mother       Social History:  Social History     Socioeconomic History    Marital status: Single     Spouse name: Not on  file    Number of children: Not on file    Years of education: Not on file    Highest education level: Not on file   Occupational History    Not on file   Tobacco Use    Smoking status: Former     Types: Cigarettes    Smokeless tobacco: Never    Tobacco comments:     History social smoking 2012 quit   Vaping Use    Vaping status: Never Used   Substance and Sexual Activity    Alcohol use: Not Currently    Drug use: Not Currently     Types: Cocaine     Comment: quit 2016    Sexual activity: Yes     Partners: Male     Birth control/protection: OCP   Other Topics Concern    Not on file   Social History Narrative    Always uses seat belt    Caffeine use    Exercise habits    No living will    No preference on Denominational beliefs    Spouse/family support     Social Drivers of Health     Financial Resource Strain: Not on file   Food Insecurity: Not on file   Transportation Needs: Not on file   Physical Activity: Not on file   Stress: Not on file   Social Connections: Not on file   Intimate Partner Violence: Not on file   Housing Stability: Not on file       Objective     Vitals:    12/19/24 1109   BP: 118/80   Pulse: 78   Resp: 18   Temp: 98.7 °F (37.1 °C)   SpO2: 99%     Wt Readings from Last 3 Encounters:   12/19/24 88.9 kg (196 lb)   11/25/24 85.3 kg (188 lb)   11/20/24 89.2 kg (196 lb 9.6 oz)       Physical Exam  Vitals and nursing note reviewed.   Constitutional:       Appearance: Normal appearance.   HENT:      Head: Normocephalic and atraumatic.      Right Ear: Ear canal and external ear normal. Tympanic membrane is retracted.      Left Ear: Ear canal and external ear normal. Tympanic membrane is retracted.      Nose: Nose normal.      Mouth/Throat:      Mouth: Mucous membranes are moist.   Eyes:      Conjunctiva/sclera: Conjunctivae normal.   Cardiovascular:      Rate and Rhythm: Normal rate and regular rhythm.      Heart sounds: Normal heart sounds.   Pulmonary:      Effort: Pulmonary effort is normal.      Breath  "sounds: Normal breath sounds.   Abdominal:      General: Bowel sounds are normal.   Musculoskeletal:         General: Normal range of motion.      Cervical back: Normal range of motion and neck supple.   Skin:     General: Skin is warm and dry.      Capillary Refill: Capillary refill takes less than 2 seconds.   Neurological:      General: No focal deficit present.      Mental Status: She is alert and oriented to person, place, and time.   Psychiatric:         Mood and Affect: Mood normal.         Behavior: Behavior normal.         Pertinent Laboratory/Diagnostic Studies:  Lab Results   Component Value Date    GLUCOSE 94 09/21/2015    BUN 10 04/21/2023    CREATININE 0.70 04/21/2023    CALCIUM 9.4 04/21/2023     09/21/2015    K 4.1 04/21/2023    CO2 23 04/21/2023     04/21/2023     Lab Results   Component Value Date    ALT 58 04/21/2023    AST 34 04/21/2023    ALKPHOS 56 04/21/2023       Lab Results   Component Value Date    WBC 6.96 05/27/2021    HGB 14.6 05/27/2021    HCT 43.7 05/27/2021    MCV 93 05/27/2021     05/27/2021       No results found for: \"TSH\"    No results found for: \"CHOL\"  Lab Results   Component Value Date    TRIG 231 (H) 05/18/2024     Lab Results   Component Value Date    HDL 62 05/18/2024     Lab Results   Component Value Date    LDLCALC 86 05/18/2024     No results found for: \"HGBA1C\"    Results for orders placed or performed in visit on 05/18/24   Lipid Panel with Direct LDL reflex    Collection Time: 05/18/24 11:45 AM   Result Value Ref Range    Cholesterol 194 See Comment mg/dL    Triglycerides 231 (H) See Comment mg/dL    HDL, Direct 62 >=50 mg/dL    LDL Calculated 86 0 - 100 mg/dL   Helix Molecular Screen    Collection Time: 05/18/24 12:05 PM    Specimen: Blood   Result Value Ref Range    PULIDO SYNDROME DNA ANALYSIS Not Detected     HEREDITARY BREAST & OVARIAN CANCER DNA ANALYSIS Not Detected     FAMILIAL HYPERCHOLESTEROLEMIA DNA ANALYSIS Not Detected        No orders of " the defined types were placed in this encounter.      ALLERGIES:  Allergies   Allergen Reactions    Pineapple - Food Allergy Anaphylaxis     Itching/closing of the throat    Amoxicillin GI Intolerance    Mold Extract [Trichophyton] Allergic Rhinitis    Seasonal Ic [Cholestatin] Allergic Rhinitis       Current Medications     Current Outpatient Medications   Medication Sig Dispense Refill    albuterol (Ventolin HFA) 90 mcg/act inhaler Inhale 2 puffs every 4 (four) hours as needed for wheezing 18 g 5    Clocortolone Pivalate (Cloderm) 0.1 % cream Apply topically 2 (two) times a day To affected area 45 g 0    Elderberry 500 MG CAPS Take by mouth      fexofenadine (ALLEGRA) 180 MG tablet Take by mouth      FLUoxetine (PROzac) 20 MG tablet Take 1 tablet (20 mg total) by mouth daily 90 tablet 3    MAGNESIUM PO Take by mouth daily at bedtime      methylPREDNISolone 4 MG tablet therapy pack Use as directed on package 21 each 0    Multiple Vitamin (multivitamin) capsule Take 1 capsule by mouth daily      norethindrone-ethinyl estradiol (Loestrin Fe 1/20) 1-20 MG-MCG per tablet Take 1 tablet by mouth daily 84 tablet 0    Omega-3 Fatty Acids (FISH OIL PO) Take by mouth daily at bedtime      ondansetron (ZOFRAN) 4 mg tablet Take 1 tablet (4 mg total) by mouth every 6 (six) hours 12 tablet 0    Semaglutide-Weight Management (Wegovy) 2.4 MG/0.75ML Inject 2.4 mg under the skin weekly 3 mL 5    benzoyl peroxide 5 % external liquid Apply topically 2 (two) times a day (Patient not taking: Reported on 12/19/2024) 236 mL 5    clindamycin 1 % gel Apply topically 2 (two) times a day (Patient not taking: Reported on 12/19/2024) 60 g 5    mometasone (Asmanex, 30 Metered Doses,) 110 mcg/actuation AEPB inhaler Inhale 2 puffs every evening Rinse mouth after use. (Patient not taking: Reported on 12/19/2024) 1 each 1     No current facility-administered medications for this visit.         Health Maintenance     Health Maintenance   Topic Date  Due    Annual Physical  01/18/2024    Hepatitis C Screening  06/13/2026 (Originally 1995)    HIV Screening  11/20/2026 (Originally 5/6/2009)    Depression Screening  12/19/2025    DTaP,Tdap,and Td Vaccines (3 - Td or Tdap) 02/18/2026    Cervical Cancer Screening  12/08/2027    Zoster Vaccine (1 of 2) 05/06/2044    RSV Vaccine Age 60+ Years (1 - 1-dose 75+ series) 05/06/2069    Pneumococcal Vaccine: Pediatrics (0 to 5 Years) and At-Risk Patients (6 to 64 Years)  Completed    Influenza Vaccine  Completed    HPV Vaccine  Completed    COVID-19 Vaccine  Completed    Meningococcal B Vaccine  Aged Out    RSV Vaccine age 0-20 Months  Aged Out    HIB Vaccine  Aged Out    IPV Vaccine  Aged Out    Hepatitis A Vaccine  Aged Out    Meningococcal ACWY Vaccine  Aged Out     Immunization History   Administered Date(s) Administered    COVID-19 MODERNA VACC 0.5 ML IM 02/12/2021, 03/12/2021, 12/16/2021    COVID-19 Pfizer mRNA vacc PF jackie-sucrose 12 yr and older (Comirnaty) 10/09/2024    H1N1, All Formulations 10/31/2009    HPV Quadrivalent 08/24/2010, 10/26/2010, 02/03/2011    INFLUENZA 09/25/2015, 10/15/2016, 08/15/2019, 10/29/2020, 10/09/2024    Influenza Quadrivalent, 6-35 Months IM 10/15/2016    Influenza, injectable, quadrivalent, preservative free 0.5 mL 10/19/2018, 10/04/2019, 09/15/2021, 10/06/2022    Influenza, recombinant, quadrivalent,injectable, preservative free 10/29/2020    Influenza, seasonal, injectable 09/25/2015    Meningococcal, Unknown Serogroups 08/01/2008    Pneumococcal Conjugate Vaccine 20-valent (Pcv20), Polysace 01/18/2023    Tdap 08/01/2008, 02/18/2016       Depression Screening and Follow-up Plan: Patient was screened for depression during today's encounter. They screened negative with a PHQ-9 score of 1.        ISAIAS Saravia

## 2024-12-28 PROBLEM — H69.93 EUSTACHIAN TUBE DYSFUNCTION, BILATERAL: Status: ACTIVE | Noted: 2024-12-28

## 2025-01-06 ENCOUNTER — TELEPHONE (OUTPATIENT)
Age: 30
End: 2025-01-06

## 2025-01-06 NOTE — TELEPHONE ENCOUNTER
Pt did a video visit and was told she has the Norovirus. Pt wants to know how long she is contagious for before she can go back to work. Should she be doing anything else besides taking pepto bismol for the diarrhea. Please, advise.

## 2025-01-08 ENCOUNTER — TELEPHONE (OUTPATIENT)
Age: 30
End: 2025-01-08

## 2025-01-08 NOTE — TELEPHONE ENCOUNTER
Patient was diagnosed with Norovirus on 1/3, via telemedicine visit.      She is keeping hydrated, denies nausea or vomiting.  She is still having multiple episodes of diarrhea.      She was supposed to go back to work today.  She is requesting that her work note be extended to 1/10/25, as her diarrhea is still not manageable.      She has tried Pepto-bismol.  Brat diet.  She is asking if there is anything else she can do or can be called in for her diarrhea.    Please advise.    Preferred Pharmacy:  [facility] - [place], [place] - [address] [id number] [address]

## 2025-01-09 ENCOUNTER — HOSPITAL ENCOUNTER (EMERGENCY)
Facility: HOSPITAL | Age: 30
Discharge: HOME | End: 2025-01-09
Attending: EMERGENCY MEDICINE
Payer: COMMERCIAL

## 2025-01-09 ENCOUNTER — NURSE TRIAGE (OUTPATIENT)
Age: 30
End: 2025-01-09

## 2025-01-09 VITALS
SYSTOLIC BLOOD PRESSURE: 160 MMHG | OXYGEN SATURATION: 97 % | TEMPERATURE: 97.4 F | HEART RATE: 92 BPM | DIASTOLIC BLOOD PRESSURE: 98 MMHG | RESPIRATION RATE: 17 BRPM

## 2025-01-09 DIAGNOSIS — R19.7 DIARRHEA: Primary | ICD-10-CM

## 2025-01-09 LAB
ANION GAP SERPL CALCULATED.3IONS-SCNC: 9 MMOL/L (ref 4–13)
BASOPHILS # BLD AUTO: 0.02 THOUSANDS/ΜL (ref 0–0.1)
BASOPHILS NFR BLD AUTO: 0 % (ref 0–1)
BUN SERPL-MCNC: 4 MG/DL (ref 5–25)
CALCIUM SERPL-MCNC: 9.1 MG/DL (ref 8.4–10.2)
CHLORIDE SERPL-SCNC: 107 MMOL/L (ref 96–108)
CO2 SERPL-SCNC: 22 MMOL/L (ref 21–32)
CREAT SERPL-MCNC: 0.67 MG/DL (ref 0.6–1.3)
EOSINOPHIL # BLD AUTO: 0.05 THOUSAND/ΜL (ref 0–0.61)
EOSINOPHIL NFR BLD AUTO: 1 % (ref 0–6)
ERYTHROCYTE [DISTWIDTH] IN BLOOD BY AUTOMATED COUNT: 12.6 % (ref 11.6–15.1)
GFR SERPL CREATININE-BSD FRML MDRD: 118 ML/MIN/1.73SQ M
GLUCOSE SERPL-MCNC: 93 MG/DL (ref 65–140)
HCT VFR BLD AUTO: 41.3 % (ref 34.8–46.1)
HGB BLD-MCNC: 13.9 G/DL (ref 11.5–15.4)
IMM GRANULOCYTES # BLD AUTO: 0.01 THOUSAND/UL (ref 0–0.2)
IMM GRANULOCYTES NFR BLD AUTO: 0 % (ref 0–2)
LYMPHOCYTES # BLD AUTO: 2.26 THOUSANDS/ΜL (ref 0.6–4.47)
LYMPHOCYTES NFR BLD AUTO: 38 % (ref 14–44)
MCH RBC QN AUTO: 30.5 PG (ref 26.8–34.3)
MCHC RBC AUTO-ENTMCNC: 33.7 G/DL (ref 31.4–37.4)
MCV RBC AUTO: 91 FL (ref 82–98)
MONOCYTES # BLD AUTO: 0.28 THOUSAND/ΜL (ref 0.17–1.22)
MONOCYTES NFR BLD AUTO: 5 % (ref 4–12)
NEUTROPHILS # BLD AUTO: 3.35 THOUSANDS/ΜL (ref 1.85–7.62)
NEUTS SEG NFR BLD AUTO: 56 % (ref 43–75)
NRBC BLD AUTO-RTO: 0 /100 WBCS
PLATELET # BLD AUTO: 333 THOUSANDS/UL (ref 149–390)
PMV BLD AUTO: 10 FL (ref 8.9–12.7)
POTASSIUM SERPL-SCNC: 3.6 MMOL/L (ref 3.5–5.3)
RBC # BLD AUTO: 4.55 MILLION/UL (ref 3.81–5.12)
SODIUM SERPL-SCNC: 138 MMOL/L (ref 135–147)
WBC # BLD AUTO: 5.97 THOUSAND/UL (ref 4.31–10.16)

## 2025-01-09 PROCEDURE — 80048 BASIC METABOLIC PNL TOTAL CA: CPT

## 2025-01-09 PROCEDURE — 99284 EMERGENCY DEPT VISIT MOD MDM: CPT

## 2025-01-09 PROCEDURE — 96360 HYDRATION IV INFUSION INIT: CPT

## 2025-01-09 PROCEDURE — 36415 COLL VENOUS BLD VENIPUNCTURE: CPT

## 2025-01-09 PROCEDURE — 85025 COMPLETE CBC W/AUTO DIFF WBC: CPT

## 2025-01-09 PROCEDURE — 99284 EMERGENCY DEPT VISIT MOD MDM: CPT | Performed by: EMERGENCY MEDICINE

## 2025-01-09 RX ADMIN — SODIUM CHLORIDE 1000 ML: 0.9 INJECTION, SOLUTION INTRAVENOUS at 14:24

## 2025-01-09 NOTE — DISCHARGE INSTRUCTIONS
You were seen in the emergency department today for diarrhea.    Your testing showed no acute abnormality.    Follow up with your primary care provider.     Take Loperamide as needed following the instructions on the packaging for diarrhea.     Return to the emergency department for any new or concerning symptoms including abdominal pain, vomiting.     Thank you for choosing [facility] for your care today.

## 2025-01-09 NOTE — Clinical Note
LYNDON Reed was seen and treated in our emergency department on 1/9/2025.    No restrictions            Diagnosis:     LYNDON  may return to work on return date.    She may return on this date: 01/13/2025         If you have any questions or concerns, please don't hesitate to call.      Kathy Carty, DO    ______________________________           _______________          _______________  Hospital Representative                              Date                                Time

## 2025-01-09 NOTE — ED ATTENDING ATTESTATION
I saw and evaluated the patient. I have discussed the patient with the resident physician and agree with the resident's findings, assessment and plan as documented in the resident physician's note, unless otherwise documented below. All available laboratory and imaging studies were reviewed by myself.  I was present for key portions of any procedure(s) performed by the resident and I was immediately available to provide assistance.     I agree with the current assessment done in the Emergency Department. I have conducted an independent evaluation of this patient    Final Diagnosis:  1. Diarrhea            Chief Complaint   Patient presents with    Diarrhea     Since Saturday. Reports she feels dehydrated.      This is a 29 y.o. female presenting for evaluation of diarrhea. Symptoms started 1/3 and have persisted since then, having several episodes of watery stools per day. Had nausea that resolved. Denies fever, chills, cough, chest pain, SOB, abdominal pain, urinary symptoms, headache, any other complaints.    PMH:   has a past medical history of Allergic, Allergic rhinitis, Anesthesia complication, Anxiety (02/20/2018), Asthma, Asthma, Depression, Functional murmur (11/19/2012), History of back pain, History of depression, History of impacted cerumen, History of pharyngitis, History of streptococcal pharyngitis, History of tonsillitis, History of URI (upper respiratory infection), Obesity (11/19/2012), Recurrent major depressive disorder, in partial remission (HCC) (02/20/2018), Seasonal allergies, Sinus problem, Toe fracture, left (02/18/2024), Tooth wear, and Viral illness (11/02/2023).    PSH:   has a past surgical history that includes [place] tooth extraction and ORIF foot fracture (Left, 2/27/2024).    Social:  Social History     Substance and Sexual Activity   Alcohol Use Not Currently     Social History     Tobacco Use   Smoking Status Former    Types: Cigarettes   Smokeless Tobacco Never   Tobacco Comments     History social smoking 2012 quit     Social History     Substance and Sexual Activity   Drug Use Not Currently    Types: Cocaine    Comment: quit 2016     PE:  Vitals:    01/09/25 1325   BP: 160/98   BP Location: Left arm   Pulse: 92   Resp: 17   Temp: (!) 97.4 °F (36.3 °C)   TempSrc: Temporal   SpO2: 97%         Physical exam:  GENERAL APPEARANCE: Appears comfortable, no acute distress, calm and cooperative   NEURO: GCS 15, no gross focal deficits, cranial nerves grossly intact, clear fluent speech, no facial asymmetry   HEENT: Normocephalic, atraumatic, moist mucous membranes   Neck: Supple, full ROM  CV: RRR, no murmurs, rubs, or gallops  LUNGS: CTAB, no wheezing, rales, or rhonchi  GI: Abdomen soft, non-tender, no rebound or guarding   MSK: Extremities non-tender, no pitting edema  SKIN: Warm and dry      Assessment and plan: This is a 29 y.o. female presenting for evaluation of diarrhea. Within ddx consider enteritis, colitis, IBS, metabolic abnormality, dehydration.  Patient is overall well-appearing, nontoxic, appears well-hydrated. Will obtain chem panel, stool culture, treat with IV fluids.          Final assessment: Patient unable to provide stool sample. Electrolytes reassuring. Tolerating PO. Strict ED return precautions provided should symptoms worsen and patient can otherwise follow up outpatient. Patient expresses an understanding and agreement with the plan and remains in good condition for discharge.       Code Status: No Order  Advance Directive and Living Will:      Power of :    POLST:      Medications   sodium chloride 0.9 % bolus 1,000 mL (0 mL Intravenous Stopped 1/9/25 1532)     No orders to display     Orders Placed This Encounter   Procedures    Calprotectin,Fecal    Stool Enteric Bacterial Panel by PCR    Clostridium difficile toxin by PCR with EIA    CBC and differential    Basic metabolic panel     Labs Reviewed   BASIC METABOLIC PANEL - Abnormal       Result Value Ref Range  Status    Sodium 138  135 - 147 mmol/L Final    Potassium 3.6  3.5 - 5.3 mmol/L Final    Chloride 107  96 - 108 mmol/L Final    CO2 22  21 - 32 mmol/L Final    ANION GAP 9  4 - 13 mmol/L Final    BUN 4 (*) 5 - 25 mg/dL Final    Creatinine 0.67  0.60 - 1.30 mg/dL Final    Comment: Standardized to IDMS reference method    Glucose 93  65 - 140 mg/dL Final    Comment: If the patient is fasting, the ADA then defines impaired fasting glucose as > 100 mg/dL and diabetes as > or equal to 123 mg/dL.    Calcium 9.1  8.4 - 10.2 mg/dL Final    eGFR 118  ml/min/1.73sq m Final    Narrative:     National Kidney Disease Foundation guidelines for Chronic Kidney Disease (CKD):     Stage 1 with normal or high GFR (GFR > 90 mL/min/1.73 square meters)    Stage 2 Mild CKD (GFR = 60-89 mL/min/1.73 square meters)    Stage 3A Moderate CKD (GFR = 45-59 mL/min/1.73 square meters)    Stage 3B Moderate CKD (GFR = 30-44 mL/min/1.73 square meters)    Stage 4 Severe CKD (GFR = 15-29 mL/min/1.73 square meters)    Stage 5 End Stage CKD (GFR <15 mL/min/1.73 square meters)  Note: GFR calculation is accurate only with a steady state creatinine   CALPROTECTIN, FECAL   STOOL ENTERIC BACTERIAL PANEL BY PCR   CLOSTRIDIUM DIFFICILE TOXIN BY PCR WITH EIA   CBC AND DIFFERENTIAL    WBC 5.97  4.31 - 10.16 Thousand/uL Final    RBC 4.55  3.81 - 5.12 Million/uL Final    Hemoglobin 13.9  11.5 - 15.4 g/dL Final    Hematocrit 41.3  34.8 - 46.1 % Final    MCV 91  82 - 98 fL Final    MCH 30.5  26.8 - 34.3 pg Final    MCHC 33.7  31.4 - 37.4 g/dL Final    RDW 12.6  11.6 - 15.1 % Final    MPV 10.0  8.9 - 12.7 fL Final    Platelets 333  149 - 390 Thousands/uL Final    nRBC 0  /100 WBCs Final    Segmented % 56  43 - 75 % Final    Immature Grans % 0  0 - 2 % Final    Lymphocytes % 38  14 - 44 % Final    Monocytes % 5  4 - 12 % Final    Eosinophils Relative 1  0 - 6 % Final    Basophils Relative 0  0 - 1 % Final    Absolute Neutrophils 3.35  1.85 - 7.62 Thousands/µL Final     Absolute Immature Grans 0.01  0.00 - 0.20 Thousand/uL Final    Absolute Lymphocytes 2.26  0.60 - 4.47 Thousands/µL Final    Absolute Monocytes 0.28  0.17 - 1.22 Thousand/µL Final    Eosinophils Absolute 0.05  0.00 - 0.61 Thousand/µL Final    Basophils Absolute 0.02  0.00 - 0.10 Thousands/µL Final         Time reflects when diagnosis was documented in both MDM as applicable and the Disposition within this note       Time User Action Codes Description Comment    1/9/2025  4:21 PM Kathy Carty Add [R19.7] Diarrhea           ED Disposition       ED Disposition   Discharge    Condition   Stable    Date/Time   Thu Jan 9, 2025  4:21 PM    Comment   LYNDON Denise Reed discharge to home/self care.                   Follow-up Information       Follow up With Specialties Details Why Contact Info    [facility], Internal Medicine, Nurse Practitioner Schedule an appointment as soon as possible for a visit in 3 days  [address] [place] [address]  [phone]            Discharge Medication List as of 1/9/2025  4:22 PM        CONTINUE these medications which have NOT CHANGED    Details   albuterol (Ventolin HFA) 90 mcg/act inhaler Inhale 2 puffs every 4 (four) hours as needed for wheezing, Starting Wed 1/18/2023, Normal      benzoyl peroxide 5 % external liquid Apply topically 2 (two) times a day, Starting Wed 6/12/2024, Normal      clindamycin 1 % gel Apply topically 2 (two) times a day, Starting Wed 6/12/2024, Normal      Clocortolone Pivalate (Cloderm) 0.1 % cream Apply topically 2 (two) times a day To affected area, Starting Fri 9/8/2023, Normal      Elderberry 500 MG CAPS Take by mouth, Historical Med      fexofenadine (ALLEGRA) 180 MG tablet Take by mouth, Historical Med      FLUoxetine (PROzac) 20 MG tablet Take 1 tablet (20 mg total) by mouth daily, Starting Mon 5/13/2024, Normal      MAGNESIUM PO Take by mouth daily at bedtime, Historical Med      methylPREDNISolone 4 MG  tablet therapy pack Use as directed on package, Normal      mometasone (Asmanex, 30 Metered Doses,) 110 mcg/actuation AEPB inhaler Inhale 2 puffs every evening Rinse mouth after use., Starting Wed 8/21/2024, Normal      Multiple Vitamin (multivitamin) capsule Take 1 capsule by mouth daily, Historical Med      norethindrone-ethinyl estradiol (Loestrin Fe 1/20) 1-20 MG-MCG per tablet Take 1 tablet by mouth daily, Starting Tue 11/26/2024, Normal      Omega-3 Fatty Acids (FISH OIL PO) Take by mouth daily at bedtime, Historical Med      ondansetron (ZOFRAN) 4 mg tablet Take 1 tablet (4 mg total) by mouth every 6 (six) hours, Starting Fri 1/13/2023, Normal      Semaglutide-Weight Management (Wegovy) 2.4 MG/0.75ML Inject 2.4 mg under the skin weekly, Normal           No discharge procedures on file.  Prior to Admission Medications   Prescriptions Last Dose Informant Patient Reported? Taking?   Clocortolone Pivalate (Cloderm) 0.1 % cream   No No   Sig: Apply topically 2 (two) times a day To affected area   Elderberry 500 MG CAPS   Yes No   Sig: Take by mouth   FLUoxetine (PROzac) 20 MG tablet   No No   Sig: Take 1 tablet (20 mg total) by mouth daily   MAGNESIUM PO   Yes No   Sig: Take by mouth daily at bedtime   Multiple Vitamin (multivitamin) capsule  Self Yes No   Sig: Take 1 capsule by mouth daily   Omega-3 Fatty Acids (FISH OIL PO)   Yes No   Sig: Take by mouth daily at bedtime   Semaglutide-Weight Management (Wegovy) 2.4 MG/0.75ML   No No   Sig: Inject 2.4 mg under the skin weekly   albuterol (Ventolin HFA) 90 mcg/act inhaler   No No   Sig: Inhale 2 puffs every 4 (four) hours as needed for wheezing   benzoyl peroxide 5 % external liquid   No No   Sig: Apply topically 2 (two) times a day   Patient not taking: Reported on 12/19/2024   clindamycin 1 % gel   No No   Sig: Apply topically 2 (two) times a day   Patient not taking: Reported on 12/19/2024   fexofenadine (ALLEGRA) 180 MG tablet  Self Yes No   Sig: Take by mouth  "  methylPREDNISolone 4 MG tablet therapy pack   No No   Sig: Use as directed on package   mometasone (Asmanex, 30 Metered Doses,) 110 mcg/actuation AEPB inhaler   No No   Sig: Inhale 2 puffs every evening Rinse mouth after use.   Patient not taking: Reported on 12/19/2024   norethindrone-ethinyl estradiol (Loestrin Fe 1/20) 1-20 MG-MCG per tablet   No No   Sig: Take 1 tablet by mouth daily   ondansetron (ZOFRAN) 4 mg tablet   No No   Sig: Take 1 tablet (4 mg total) by mouth every 6 (six) hours      Facility-Administered Medications: None         Portions of the record may have been created with voice recognition software. Occasional wrong word or \"sound a like\" substitutions may have occurred due to the inherent limitations of voice recognition software. Read the chart carefully and recognize, using context, where substitutions have occurred.    Electronically signed by:  Aury Arthur    "

## 2025-01-09 NOTE — TELEPHONE ENCOUNTER
"Pt with diarrhea for over a week.  She had sixteen BMs yesterday and several already today.  She is trying to hydrate with oral fluids.  Pt denies any dizziness or weakness.  She is having decreased urine output, however she did urinate twice today.  She denies any abdominal pain.  She had a low grade temp of 100.8 a few days ago, but she does not have a fever anymore.  Mom is worried about pt's hydration status.  She wants to know if she can have IV hydration in the office.  RN advised ED for IV hydration and evaluation since diarrhea has been ongoing for over a week.     Reason for Disposition   SEVERE diarrhea (e.g., 7 or more times / day more than normal) and present > 24 hours (1 day)    Answer Assessment - Initial Assessment Questions  1. DIARRHEA SEVERITY: \"How bad is the diarrhea?\" \"How many more stools have you had in the past 24 hours than normal?\"       16  2. ONSET: \"When did the diarrhea begin?\"       For over a week   3. STOOL DESCRIPTION:  \"How loose or watery is the diarrhea?\" \"What is the stool color?\" \"Is there any blood or mucous in the stool?\"      Loose and watery   4. VOMITING: \"Are you also vomiting?\" If Yes, ask: \"How many times in the past 24 hours?\"       no  5. ABDOMEN PAIN: \"Are you having any abdomen pain?\" If Yes, ask: \"What does it feel like?\" (e.g., crampy, dull, intermittent, constant)       no  6. ABDOMEN PAIN SEVERITY: If present, ask: \"How bad is the pain?\"  (e.g., Scale 1-10; mild, moderate, or severe)      no  7. ORAL INTAKE: If vomiting, \"Have you been able to drink liquids?\" \"How much liquids have you had in the past 24 hours?\"      Trying to hydrate   8. HYDRATION: \"Any signs of dehydration?\" (e.g., dry mouth [not just dry lips], too weak to stand, dizziness, new weight loss) \"When did you last urinate?\"      no  9. EXPOSURE: \"Have you traveled to a foreign country recently?\" \"Have you been exposed to anyone with diarrhea?\" \"Could you have eaten any food that was spoiled?\"    " "  no  10. ANTIBIOTIC USE: \"Are you taking antibiotics now or have you taken antibiotics in the past 2 months?\"        no  11. OTHER SYMPTOMS: \"Do you have any other symptoms?\" (e.g., fever, blood in stool)        no  12. PREGNANCY: \"Is there any chance you are pregnant?\" \"When was your last menstrual period?\"        no    Protocols used: Diarrhea-Adult-[place]    "

## 2025-01-09 NOTE — ED PROVIDER NOTES
Physical Therapy Treatment    Patient Name:  Suzanne Villeda   MRN:  [id number]    Recommendations:     Discharge Recommendations:  home   Discharge Equipment Recommendations: none   Barriers to discharge: None    Assessment:     Suzanne Villeda is a 57 y.o. female admitted with a medical diagnosis of Chronic myelomonocytic leukemia not having achieved remission.  She presents with the following impairments/functional limitations:  impaired endurance, weakness, pain.      Pt remains independent with all functional mobility assessed.  Introduced pt to a walking program, with expectations for the pt to amb at least 5-6 minutes 2xs/day.  Also introduced exercise program, to be performed in sit 3xs/day.  6MWT indicates deficits in aerobic capacity.  10x sit<>stand testing indicates deficits in B LE mm strength.      Rehab Prognosis: Good; patient would benefit from acute skilled PT services to address these deficits and reach maximum level of function.    Recent Surgery: * No surgery found *      Plan:     During this hospitalization, patient to be seen 1 x/week to address the identified rehab impairments via gait training, therapeutic activities, therapeutic exercises, neuromuscular re-education and progress toward the following goals:    · Plan of Care Expires:  04/24/20    Subjective     Chief Complaint: Abdominal pain  Patient/Family Comments/goals: to eat  Pain/Comfort:  · Pain Rating 1: 4/10  · Location 1: abdomen      Objective:     Communicated with nursing prior to session.  Patient found up in chair with (central) upon PT entry to room.     General Precautions: Standard, neutropenic, fall   Orthopedic Precautions:N/A   Braces: N/A     Functional Mobility:  · Transfers:     · Sit to Stand:  independence with no AD  · Bed to Chair: independence with  no AD  using  Stand Pivot  · Gait: Pt amb >500', I, pushing IV pole, intermittent episodes of scissoring  · Balance: I: dynamic standing balance without  Time reflects when diagnosis was documented in both MDM as applicable and the Disposition within this note       Time User Action Codes Description Comment    1/9/2025  4:21 PM Kathy Carty Add [R19.7] Diarrhea           ED Disposition       ED Disposition   Discharge    Condition   Stable    Date/Time   Thu Jan 9, 2025  4:21 PM    Comment   LYNDON Reed discharge to home/self care.                   Assessment & Plan       Medical Decision Making  Amount and/or Complexity of Data Reviewed  Labs: ordered. Decision-making details documented in ED Course.      Patient is a 29 y.o. female with PMH of diarrhea who presents to the ED with multiple episodes of diarrhea.    Vital signs stable. On exam well appearing, in no acute distress, normal capillary refill.    History and physical exam most consistent with viral illness. However, differential diagnosis included but not limited to electrolyte abnormality.     Plan: CBC, BMP, IV fluids    View ED course for further discussion on patient workup.     On review of previous records -reviewed nursing triage note from earlier today, patient with diarrhea for over a week was advised to come to the ED for IV hydration    All labs reviewed and utilized in the medical decision making process  All radiology studies independently viewed by me and interpreted by the radiologist.  I reviewed all testing with the patient.     Upon re-evaluation patient resting comfortably no acute distress, no episodes of diarrhea while in the emergency department despite receiving IV fluids.    Disposition: I have reviewed the patient's vital signs, nursing notes, and other relevant tests/information. I had a detailed discussion with the patient regarding the history, exam findings, and any diagnostic results.   Plan to discharge home in stable condition, follow up with PCP.  Discussed with patient who is agreeable to plan.  I discussed discharge instructions, need for follow-up, and oral  "return precautions for what to return for in addition to the written return precautions and discharge instructions, specifically highlighting areas of special concern.  The patient verbalized understanding of the discharge instructions and warnings that would necessitate return to the Emergency Department including abdominal pain, dehydration.  All questions the patient had were answered prior to discharge to the best of my ability.       Portions of the record may have been created with voice recognition software. Occasional wrong word or \"sound a like\" substitutions may have occurred due to the inherent limitations of voice recognition software. Read the chart carefully and recognize, using context, where substitutions have occurred.    ED Course as of 01/09/25 1650   Thu Jan 09, 2025   1447 WBC: 5.97  No acute leukocytosis, less concern for C. difficile   1456 Potassium: 3.6  No acute hypokalemia       Medications   sodium chloride 0.9 % bolus 1,000 mL (0 mL Intravenous Stopped 1/9/25 1532)       ED Risk Strat Scores                                              History of Present Illness       Chief Complaint   Patient presents with    Diarrhea     Since Saturday. Reports she feels dehydrated.        Past Medical History:   Diagnosis Date    Allergic     Allergic rhinitis     Anesthesia complication     woke up in the middle of wisdom teeth removal    Anxiety 02/20/2018    Asthma     Asthma     Depression     Functional murmur 11/19/2012    History of back pain     History of depression     History of impacted cerumen     unspecified laterality    History of pharyngitis     History of streptococcal pharyngitis     History of tonsillitis     History of URI (upper respiratory infection)     Obesity 11/19/2012    Recurrent major depressive disorder, in partial remission (HCC) 02/20/2018    Seasonal allergies     Sinus problem     Toe fracture, left 02/18/2024    5th toe    Tooth wear     gem with dental bond-upper " AD      AM-PAC 6 CLICK MOBILITY  Turning over in bed (including adjusting bedclothes, sheets and blankets)?: 4  Sitting down on and standing up from a chair with arms (e.g., wheelchair, bedside commode, etc.): 4  Moving from lying on back to sitting on the side of the bed?: 4  Moving to and from a bed to a chair (including a wheelchair)?: 4  Need to walk in hospital room?: 4  Climbing 3-5 steps with a railing?: 4  Basic Mobility Total Score: 24       Therapeutic Activities and Exercises:   Whiteboard updated  Ankle pumps: 20 reps, in sit   LAQs: 20 reps each LE perf individually, in sit  SLR: 20 reps each LE perf individually, in sit  Heel slides: 20 reps each LE perf individually, in sit  SKTC: 20 reps, in reclined sitting posture    6MWT: Pt amb 448' = 136.55m, 4/10 RPE following  Gait speed: 0.38 m/s    10x sit<>stand: 28.87 sec, 3/10 RPE following    Patient left up in chair with all lines intact, call button in reach and nursing present..    GOALS:   Multidisciplinary Problems     Physical Therapy Goals        Problem: Physical Therapy Goal    Goal Priority Disciplines Outcome Goal Variances Interventions   Physical Therapy Goal     PT, PT/OT Ongoing, Progressing     Description:  Goals to be met by: 2020     Patient will increase functional independence with mobility by performin. Supine to sit with Set-up [place]   2. Sit to supine with Set-up [place]   3. Sit to stand transfer with Supervision -Met 2020   4. Bed to chair transfer with Supervision -Met 2020   5. Gait  x 200 feet with Supervision. - GOAL MET    6. Lower extremity exercise program x 20 reps per handout, with independence  7. Pt to perf 10x sit<>stand: 26 sec, to demonstrate improvements in B LE mm strength.  8. Pt to perf 6MWT: amb 548', to demonstrate a clinically significant improvement in aerobic capacity.                            Time Tracking:     PT Received On: 20  PT Start Time: 907     PT Stop  Time: 0931  PT Total Time (min): 24 min     Billable Minutes: Therapeutic Exercise 24    Treatment Type: Treatment  PT/PTA: PT     PTA Visit Number: 1     Billie Duarte PT  04/23/2020   left    Viral illness 11/02/2023      Past Surgical History:   Procedure Laterality Date    ORIF FOOT FRACTURE Left 2/27/2024    Procedure: LEFT FOOT 5TH METATARSAL OPEN REDUCTION WITH INTERNAL FIXATION;  Surgeon: Jamshid Escobar DPM;  Location: [facility];  Service: Podiatry    WISDOM TOOTH EXTRACTION        Family History   Problem Relation Age of Onset    Hypothyroidism Mother     Osteoporosis Mother     Breast cancer Mother 50    Thyroid disease Mother     Diabetes Father     Arthritis Father     Asthma Father     Heart defect Other         Cardiac disorder    Mental illness Neg Hx         Mother      Social History     Tobacco Use    Smoking status: Former     Types: Cigarettes    Smokeless tobacco: Never    Tobacco comments:     History social smoking 2012 quit   Vaping Use    Vaping status: Never Used   Substance Use Topics    Alcohol use: Not Currently    Drug use: Not Currently     Types: Cocaine     Comment: quit 2016      E-Cigarette/Vaping    E-Cigarette Use Never User       E-Cigarette/Vaping Substances    Nicotine No     THC No     CBD No     Flavoring No     Other No     Unknown No       I have reviewed and agree with the history as documented.     HPI  Patient is a 29 y.o. female with history of no relevant PMH presenting to the emergency department for diarrhea. Patient states that since last Friday she has been having diarrhea.  States that she initially had a fever of 100.8 however has not had a fever since then.  Also complains having some bodyaches.  Denies having any abdominal pain or cramping.  States that she had 16 episodes of diarrhea yesterday and 5 so far today.  Her stool is watery, has not had any blood but has been slightly darker since taking Pepto.  Patient had some nausea when her symptoms first started and took Zofran which relieved that.  Denies having any nausea, vomiting at this time and has been tolerating fluids without difficulty.      Review of Systems   Constitutional:   Negative for fever.   HENT:  Negative for congestion.    Eyes:  Negative for visual disturbance.   Respiratory:  Negative for shortness of breath.    Cardiovascular:  Negative for chest pain.   Gastrointestinal:  Positive for diarrhea. Negative for abdominal pain and vomiting.   Endocrine: Negative for polyuria.   Genitourinary:  Positive for decreased urine volume. Negative for dysuria.   Musculoskeletal:  Negative for gait problem.   Skin:  Negative for rash.   Neurological:  Negative for dizziness.   All other systems reviewed and are negative.          Objective       ED Triage Vitals [01/09/25 1325]   Temperature Pulse Blood Pressure Respirations SpO2 Patient Position - Orthostatic VS   (!) 97.4 °F (36.3 °C) 92 160/98 17 97 % Sitting      Temp Source Heart Rate Source BP Location FiO2 (%) Pain Score    Temporal Monitor Left arm -- --      Vitals      Date and Time Temp Pulse SpO2 Resp BP Pain Score FACES Pain Rating User   01/09/25 1325 97.4 °F (36.3 °C) 92 97 % 17 160/98 -- --             Physical Exam  Vitals and nursing note reviewed.   Constitutional:       General: She is not in acute distress.     Appearance: She is not ill-appearing.   HENT:      Head: Normocephalic and atraumatic.      Mouth/Throat:      Mouth: Mucous membranes are moist.   Eyes:      Conjunctiva/sclera: Conjunctivae normal.   Cardiovascular:      Rate and Rhythm: Normal rate.   Pulmonary:      Effort: Pulmonary effort is normal. No respiratory distress.   Abdominal:      General: There is no distension.      Palpations: Abdomen is soft.      Tenderness: There is no abdominal tenderness. There is no guarding.   Musculoskeletal:         General: Normal range of motion.      Cervical back: Neck supple.   Skin:     General: Skin is warm.      Capillary Refill: Capillary refill takes less than 2 seconds.   Neurological:      General: No focal deficit present.      Mental Status: She is alert.   Psychiatric:         Mood and Affect: Mood  normal.         Results Reviewed       Procedure Component Value Units Date/Time    Basic metabolic panel [[id number]]  (Abnormal) Collected: 01/09/25 1422    Lab Status: Final result Specimen: Blood from Arm, Right Updated: 01/09/25 1455     Sodium 138 mmol/L      Potassium 3.6 mmol/L      Chloride 107 mmol/L      CO2 22 mmol/L      ANION GAP 9 mmol/L      BUN 4 mg/dL      Creatinine 0.67 mg/dL      Glucose 93 mg/dL      Calcium 9.1 mg/dL      eGFR 118 ml/min/1.73sq m     Narrative:      National Kid[employer] guidelines for Chronic Kidney Disease (CKD):     Stage 1 with normal or high GFR (GFR > 90 mL/min/1.73 square meters)    Stage 2 Mild CKD (GFR = 60-89 mL/min/1.73 square meters)    Stage 3A Moderate CKD (GFR = 45-59 mL/min/1.73 square meters)    Stage 3B Moderate CKD (GFR = 30-44 mL/min/1.73 square meters)    Stage 4 Severe CKD (GFR = 15-29 mL/min/1.73 square meters)    Stage 5 End Stage CKD (GFR <15 mL/min/1.73 square meters)  Note: GFR calculation is accurate only with a steady state creatinine    CBC and differential [[id number]] Collected: 01/09/25 1422    Lab Status: Final result Specimen: Blood from Arm, Right Updated: 01/09/25 1435     WBC 5.97 Thousand/uL      RBC 4.55 Million/uL      Hemoglobin 13.9 g/dL      Hematocrit 41.3 %      MCV 91 fL      MCH 30.5 pg      MCHC 33.7 g/dL      RDW 12.6 %      MPV 10.0 fL      Platelets 333 Thousands/uL      nRBC 0 /100 WBCs      Segmented % 56 %      Immature Grans % 0 %      Lymphocytes % 38 %      Monocytes % 5 %      Eosinophils Relative 1 %      Basophils Relative 0 %      Absolute Neutrophils 3.35 Thousands/µL      Absolute Immature Grans 0.01 Thousand/uL      Absolute Lymphocytes 2.26 Thousands/µL      Absolute Monocytes 0.28 Thousand/µL      Eosinophils Absolute 0.05 Thousand/µL      Basophils Absolute 0.02 Thousands/µL     Stool Enteric Bacterial Panel by PCR [[id number]]     Lab Status: No result Specimen: Stool from Rectum     Clostridium  difficile toxin by PCR with EIA [[id number]]     Lab Status: No result Specimen: Stool from Per Rectum     Calprotectin,Fecal [[id number]]     Lab Status: No result Specimen: Stool             No orders to display       Procedures    ED Medication and Procedure Management   Prior to Admission Medications   Prescriptions Last Dose Informant Patient Reported? Taking?   Clocortolone Pivalate (Cloderm) 0.1 % cream   No No   Sig: Apply topically 2 (two) times a day To affected area   Elderberry 500 MG CAPS   Yes No   Sig: Take by mouth   FLUoxetine (PROzac) 20 MG tablet   No No   Sig: Take 1 tablet (20 mg total) by mouth daily   MAGNESIUM PO   Yes No   Sig: Take by mouth daily at bedtime   Multiple Vitamin (multivitamin) capsule  Self Yes No   Sig: Take 1 capsule by mouth daily   Omega-3 Fatty Acids (FISH OIL PO)   Yes No   Sig: Take by mouth daily at bedtime   Semaglutide-Weight Management (Wegovy) 2.4 MG/0.75ML   No No   Sig: Inject 2.4 mg under the skin weekly   albuterol (Ventolin HFA) 90 mcg/act inhaler   No No   Sig: Inhale 2 puffs every 4 (four) hours as needed for wheezing   benzoyl peroxide 5 % external liquid   No No   Sig: Apply topically 2 (two) times a day   Patient not taking: Reported on 12/19/2024   clindamycin 1 % gel   No No   Sig: Apply topically 2 (two) times a day   Patient not taking: Reported on 12/19/2024   fexofenadine (ALLEGRA) 180 MG tablet  Self Yes No   Sig: Take by mouth   methylPREDNISolone 4 MG tablet therapy pack   No No   Sig: Use as directed on package   mometasone (Asmanex, 30 Metered Doses,) 110 mcg/actuation AEPB inhaler   No No   Sig: Inhale 2 puffs every evening Rinse mouth after use.   Patient not taking: Reported on 12/19/2024   norethindrone-ethinyl estradiol (Loestrin Fe 1/20) 1-20 MG-MCG per tablet   No No   Sig: Take 1 tablet by mouth daily   ondansetron (ZOFRAN) 4 mg tablet   No No   Sig: Take 1 tablet (4 mg total) by mouth every 6 (six) hours      Facility-Administered  Medications: None     Discharge Medication List as of 1/9/2025  4:22 PM        CONTINUE these medications which have NOT CHANGED    Details   albuterol (Ventolin HFA) 90 mcg/act inhaler Inhale 2 puffs every 4 (four) hours as needed for wheezing, Starting Wed 1/18/2023, Normal      benzoyl peroxide 5 % external liquid Apply topically 2 (two) times a day, Starting Wed 6/12/2024, Normal      clindamycin 1 % gel Apply topically 2 (two) times a day, Starting Wed 6/12/2024, Normal      Clocortolone Pivalate (Cloderm) 0.1 % cream Apply topically 2 (two) times a day To affected area, Starting Fri 9/8/2023, Normal      Elderberry 500 MG CAPS Take by mouth, Historical Med      fexofenadine (ALLEGRA) 180 MG tablet Take by mouth, Historical Med      FLUoxetine (PROzac) 20 MG tablet Take 1 tablet (20 mg total) by mouth daily, Starting Mon 5/13/2024, Normal      MAGNESIUM PO Take by mouth daily at bedtime, Historical Med      methylPREDNISolone 4 MG tablet therapy pack Use as directed on package, Normal      mometasone (Asmanex, 30 Metered Doses,) 110 mcg/actuation AEPB inhaler Inhale 2 puffs every evening Rinse mouth after use., Starting Wed 8/21/2024, Normal      Multiple Vitamin (multivitamin) capsule Take 1 capsule by mouth daily, Historical Med      norethindrone-ethinyl estradiol (Loestrin Fe 1/20) 1-20 MG-MCG per tablet Take 1 tablet by mouth daily, Starting Tue 11/26/2024, Normal      Omega-3 Fatty Acids (FISH OIL PO) Take by mouth daily at bedtime, Historical Med      ondansetron (ZOFRAN) 4 mg tablet Take 1 tablet (4 mg total) by mouth every 6 (six) hours, Starting Fri 1/13/2023, Normal      Semaglutide-Weight Management (Wegovy) 2.4 MG/0.75ML Inject 2.4 mg under the skin weekly, Normal           No discharge procedures on file.  ED SEPSIS DOCUMENTATION   Time reflects when diagnosis was documented in both MDM as applicable and the Disposition within this note       Time User Action Codes Description Comment    1/9/2025   4:21 PM Kathy Carty Add [R19.7] Diarrhea                  Kathy Carty,   01/09/25 6272

## 2025-02-14 ENCOUNTER — TELEPHONE (OUTPATIENT)
Age: 30
End: 2025-02-14

## 2025-02-14 ENCOUNTER — OFFICE VISIT (OUTPATIENT)
Dept: URGENT CARE | Age: 30
End: 2025-02-14
Payer: COMMERCIAL

## 2025-02-14 VITALS
HEART RATE: 105 BPM | WEIGHT: 188.7 LBS | DIASTOLIC BLOOD PRESSURE: 100 MMHG | SYSTOLIC BLOOD PRESSURE: 138 MMHG | TEMPERATURE: 98.2 F | RESPIRATION RATE: 20 BRPM | BODY MASS INDEX: 35.65 KG/M2 | OXYGEN SATURATION: 98 %

## 2025-02-14 DIAGNOSIS — Z30.09 BIRTH CONTROL COUNSELING: ICD-10-CM

## 2025-02-14 DIAGNOSIS — U07.1 COVID: Primary | ICD-10-CM

## 2025-02-14 PROCEDURE — S9083 URGENT CARE CENTER GLOBAL: HCPCS | Performed by: PHYSICIAN ASSISTANT

## 2025-02-14 PROCEDURE — G0383 LEV 4 HOSP TYPE B ED VISIT: HCPCS | Performed by: PHYSICIAN ASSISTANT

## 2025-02-14 RX ORDER — NIRMATRELVIR AND RITONAVIR 300-100 MG
3 KIT ORAL 2 TIMES DAILY
Qty: 30 TABLET | Refills: 0 | Status: SHIPPED | OUTPATIENT
Start: 2025-02-14 | End: 2025-02-19

## 2025-02-14 NOTE — PATIENT INSTRUCTIONS
Paxlovid as prescribed.  Continue over-the-counter symptomatic treatment as needed.  Fever free and off fever lowering medications for 24 hours.  Mask for full 5 days from when symptoms started.  Follow-up with PCP in 3 to 5 days if symptoms or not improved.  If symptoms worsen or new symptom develop report to emergency room.

## 2025-02-14 NOTE — PROGRESS NOTES
[facility]        NAME: LYNDON Reed is a 29 y.o. female  : 1995    MRN: [id number]  DATE: 2025  TIME: 4:13 PM    Assessment and Plan   COVID [U07.1]  1. COVID  nirmatrelvir & ritonavir (Paxlovid, 300/100,) tablet therapy pack      Notes with symptoms and examination consistent with COVID with positive test at home.  Will prescribe Paxlovid.  Discussed quarantine and masking procedures.    Medical Decision Making     PROBLEM: 1 acute illness with systemic symptoms    DATA: None     RISK: Prescription drug management    TIME: 15 minutes      Patient Instructions     Patient Instructions   Paxlovid as prescribed.  Continue over-the-counter symptomatic treatment as needed.  Fever free and off fever lowering medications for 24 hours.  Mask for full 5 days from when symptoms started.  Follow-up with PCP in 3 to 5 days if symptoms or not improved.  If symptoms worsen or new symptom develop report to emergency room.    Follow up with PCP in 3-5 days.  Proceed to  ER if symptoms worsen.    If tests have been performed at [facility], our office will contact you with results if changes need to be made to the care plan discussed with you at the visit. You can review your full results on [facility].     Chief Complaint     Chief Complaint   Patient presents with    Fever    Headache    Cough     Fever, headache , cough and sinus congestion it started today.         History of Present Illness       Male presents with complaint of runny nose sinus congestion and headache that began on Tuesday.  She notes that she developed a fever today.  Patient take a COVID test today which was positive.  She has a history of asthma and is requesting Paxlovid.  Mild cough but denies any shortness of breath or chest pain at this time.  She is on doxycycline for sinus infection as well.    Fever  Associated symptoms include congestion, coughing and headaches. Pertinent negatives include no chills, fever or  sore throat.   Headache  Cough  Associated symptoms include ear pain, headaches, postnasal drip and rhinorrhea. Pertinent negatives include no chills, fever or sore throat.       Review of Systems   Review of Systems   Constitutional:  Negative for chills and fever.   HENT:  Positive for congestion, ear pain, postnasal drip and rhinorrhea. Negative for sore throat.    Respiratory:  Positive for cough.    Neurological:  Positive for headaches.         Current Medications       Current Outpatient Medications:     nirmatrelvir & ritonavir (Paxlovid, 300/100,) tablet therapy pack, Take 3 tablets by mouth 2 (two) times a day for 5 days Take 2 nirmatrelvir tablets + 1 ritonavir tablet together per dose, Disp: 30 tablet, Rfl: 0    albuterol (Ventolin HFA) 90 mcg/act inhaler, Inhale 2 puffs every 4 (four) hours as needed for wheezing, Disp: 18 g, Rfl: 5    benzoyl peroxide 5 % external liquid, Apply topically 2 (two) times a day (Patient not taking: Reported on 12/19/2024), Disp: 236 mL, Rfl: 5    clindamycin 1 % gel, Apply topically 2 (two) times a day (Patient not taking: Reported on 12/19/2024), Disp: 60 g, Rfl: 5    Clocortolone Pivalate (Cloderm) 0.1 % cream, Apply topically 2 (two) times a day To affected area, Disp: 45 g, Rfl: 0    Elderberry 500 MG CAPS, Take by mouth, Disp: , Rfl:     fexofenadine (ALLEGRA) 180 MG tablet, Take by mouth, Disp: , Rfl:     FLUoxetine (PROzac) 20 MG tablet, Take 1 tablet (20 mg total) by mouth daily, Disp: 90 tablet, Rfl: 3    MAGNESIUM PO, Take by mouth daily at bedtime, Disp: , Rfl:     methylPREDNISolone 4 MG tablet therapy pack, Use as directed on package, Disp: 21 each, Rfl: 0    mometasone (Asmanex, 30 Metered Doses,) 110 mcg/actuation AEPB inhaler, Inhale 2 puffs every evening Rinse mouth after use. (Patient not taking: Reported on 12/19/2024), Disp: 1 each, Rfl: 1    Multiple Vitamin (multivitamin) capsule, Take 1 capsule by mouth daily, Disp: , Rfl:     norethindrone-ethinyl  estradiol (Loestrin Fe 1/20) 1-20 MG-MCG per tablet, Take 1 tablet by mouth daily, Disp: 84 tablet, Rfl: 0    Omega-3 Fatty Acids (FISH OIL PO), Take by mouth daily at bedtime, Disp: , Rfl:     ondansetron (ZOFRAN) 4 mg tablet, Take 1 tablet (4 mg total) by mouth every 6 (six) hours, Disp: 12 tablet, Rfl: 0    Semaglutide-Weight Management (Wegovy) 2.4 MG/0.75ML, Inject 2.4 mg under the skin weekly, Disp: 3 mL, Rfl: 5    Current Allergies     Allergies as of 02/14/2025 - Reviewed 02/14/2025   Allergen Reaction Noted    Pineapple - food allergy Anaphylaxis 02/22/2024    Amoxicillin GI Intolerance 09/04/2021    Mold extract [trichophyton] Allergic Rhinitis 04/01/2015    Seasonal ic [cholestatin] Allergic Rhinitis 10/04/2019            The following portions of the patient's history were reviewed and updated as appropriate: allergies, current medications, past family history, past medical history, past social history, past surgical history and problem list.     Past Medical History:   Diagnosis Date    Allergic     Allergic rhinitis     Anesthesia complication     woke up in the middle of wisdom teeth removal    Anxiety 02/20/2018    Asthma     Asthma     Depression     Functional murmur 11/19/2012    History of back pain     History of depression     History of impacted cerumen     unspecified laterality    History of pharyngitis     History of streptococcal pharyngitis     History of tonsillitis     History of URI (upper respiratory infection)     Obesity 11/19/2012    Recurrent major depressive disorder, in partial remission (HCC) 02/20/2018    Seasonal allergies     Sinus problem     Toe fracture, left 02/18/2024    5th toe    Tooth wear     gem with dental bond-upper left    Viral illness 11/02/2023       Past Surgical History:   Procedure Laterality Date    ORIF FOOT FRACTURE Left 2/27/2024    Procedure: LEFT FOOT 5TH METATARSAL OPEN REDUCTION WITH INTERNAL FIXATION;  Surgeon: Jamshid Escobar DPM;  Location: [facility]  MAIN OR;  Service: Podiatry    WISDOM TOOTH EXTRACTION         Family History   Problem Relation Age of Onset    Hypothyroidism Mother     Osteoporosis Mother     Breast cancer Mother 50    Thyroid disease Mother     Diabetes Father     Arthritis Father     Asthma Father     Heart defect Other         Cardiac disorder    Mental illness Neg Hx         Mother         Medications have been verified.        Objective   /100 (BP Location: Left arm) Comment (BP Location): manual  Pulse 105   Temp 98.2 °F (36.8 °C) (Tympanic)   Resp 20   Wt 85.6 kg (188 lb 11.2 oz)   LMP 02/14/2025 Comment: on her period now  SpO2 98%   BMI 35.65 kg/m²   Patient's last menstrual period was 02/14/2025.       Physical Exam     Physical Exam  Vitals and nursing note reviewed.   Constitutional:       General: She is not in acute distress.     Appearance: Normal appearance. She is not ill-appearing or toxic-appearing.   HENT:      Head: Normocephalic and atraumatic.      Jaw: No trismus.      Right Ear: Tympanic membrane, ear canal and external ear normal. There is no impacted cerumen. No foreign body.      Left Ear: Ear canal and external ear normal. A middle ear effusion is present. There is no impacted cerumen. No foreign body. Tympanic membrane is injected.      Nose: Mucosal edema, congestion and rhinorrhea present. No nasal deformity. Rhinorrhea is clear.      Right Nostril: No foreign body, epistaxis or occlusion.      Left Nostril: No foreign body, epistaxis or occlusion.      Right Turbinates: Not enlarged, swollen or pale.      Left Turbinates: Not enlarged, swollen or pale.      Mouth/Throat:      Lips: Pink. No lesions.      Mouth: Mucous membranes are moist. No injury, oral lesions or angioedema.      Dentition: Normal dentition.      Tongue: No lesions. Tongue does not deviate from midline.      Palate: No mass and lesions.      Pharynx: Uvula midline. Postnasal drip present. No pharyngeal swelling, oropharyngeal  "exudate, posterior oropharyngeal erythema or uvula swelling.      Tonsils: No tonsillar exudate or tonsillar abscesses.   Eyes:      General: Lids are normal. Gaze aligned appropriately. No allergic shiner.     Extraocular Movements: Extraocular movements intact.   Cardiovascular:      Rate and Rhythm: Normal rate and regular rhythm.      Heart sounds: Normal heart sounds, S1 normal and S2 normal.   Pulmonary:      Effort: Pulmonary effort is normal.      Breath sounds: Normal breath sounds.      Comments: Patient speaking in full sentences with no increased respiratory effort.   No audible wheezing or stridor.   Lymphadenopathy:      Cervical: No cervical adenopathy.   Skin:     General: Skin is warm and dry.   Neurological:      Mental Status: She is alert and oriented to person, place, and time.      Coordination: Coordination is intact.      Gait: Gait is intact.   Psychiatric:         Attention and Perception: Attention and perception normal.         Mood and Affect: Mood and affect normal.         Speech: Speech normal.         Behavior: Behavior is cooperative.               Note: Portions of this record may have been created with voice recognition software. Occasional wrong word or \"sound a like\" substitutions may have occurred due to the inherent limitations of voice recognition software. Please read the chart carefully and recognize, using context, where substitutions have occurred.*      "

## 2025-02-14 NOTE — TELEPHONE ENCOUNTER
Reason for call:   [x] Refill   [] Prior Auth  [] Other:     Office:   [] PCP/Provider -   [x] Specialty/Provider - OBGYN/[facility]    Medication: Loestrinn Fe 1/20    Dose/Frequency: 1 tab daily    Quantity: 84    Pharmacy: [facility] - [place], [place] - [address] [phone]     Does the patient have enough for 3 days?   [] Yes   [x] No - Send as HP to POD

## 2025-02-14 NOTE — TELEPHONE ENCOUNTER
The patient called to inform that she had a home test for covid and it came out positive and she is having the following symptoms: headache, nausea and congestion. The patient wants martha  to send the  paxlovid to the pharmacy.

## 2025-02-14 NOTE — LETTER
February 14, 2025     Patient: LYNDON Reed   YOB: 1995   Date of Visit: 2/14/2025       To Whom It May Concern:    It is my medical opinion that LYNDON Reed should remain out of work until fever line medications for 24 hours .    If you have any questions or concerns, please don't hesitate to call.         Sincerely,        Ana Bird PA-C    CC: No Recipients

## 2025-02-17 DIAGNOSIS — Z30.09 BIRTH CONTROL COUNSELING: ICD-10-CM

## 2025-02-17 RX ORDER — NORETHINDRONE ACETATE AND ETHINYL ESTRADIOL 1MG-20(21)
1 KIT ORAL DAILY
Qty: 84 TABLET | Refills: 0 | OUTPATIENT
Start: 2025-02-17

## 2025-02-17 RX ORDER — NORETHINDRONE ACETATE AND ETHINYL ESTRADIOL 1MG-20(21)
1 KIT ORAL DAILY
Qty: 84 TABLET | Refills: 0 | Status: SHIPPED | OUTPATIENT
Start: 2025-02-17

## 2025-02-17 NOTE — TELEPHONE ENCOUNTER
Pt could not do today but I found a spot for her 4-14 at 330. Will you be able to refill prescription until then?

## 2025-02-19 ENCOUNTER — OFFICE VISIT (OUTPATIENT)
Dept: FAMILY MEDICINE CLINIC | Facility: CLINIC | Age: 30
End: 2025-02-19
Payer: COMMERCIAL

## 2025-02-19 VITALS
OXYGEN SATURATION: 98 % | DIASTOLIC BLOOD PRESSURE: 80 MMHG | TEMPERATURE: 98.1 F | WEIGHT: 186 LBS | SYSTOLIC BLOOD PRESSURE: 120 MMHG | HEART RATE: 87 BPM | BODY MASS INDEX: 35.12 KG/M2 | RESPIRATION RATE: 17 BRPM | HEIGHT: 61 IN

## 2025-02-19 DIAGNOSIS — J45.21 MILD INTERMITTENT ASTHMA WITH EXACERBATION: ICD-10-CM

## 2025-02-19 DIAGNOSIS — Z00.00 ANNUAL PHYSICAL EXAM: Primary | ICD-10-CM

## 2025-02-19 DIAGNOSIS — G47.10 HYPERSOMNIA: ICD-10-CM

## 2025-02-19 PROCEDURE — 99395 PREV VISIT EST AGE 18-39: CPT | Performed by: NURSE PRACTITIONER

## 2025-02-19 NOTE — PATIENT INSTRUCTIONS
"Patient Education     Routine physical for adults   The Basics   Written by the doctors and editors at [facility]   What is a physical? -- A physical is a routine visit, or \"check-up,\" with your doctor. You might also hear it called a \"wellness visit\" or \"preventive visit.\"  During each visit, the doctor will:   Ask about your physical and mental health   Ask about your habits, behaviors, and lifestyle   Do an exam   Give you vaccines if needed   Talk to you about any medicines you take   Give advice about your health   Answer your questions  Getting regular check-ups is an important part of taking care of your health. It can help your doctor find and treat any problems you have. But it's also important for preventing health problems.  A routine physical is different from a \"sick visit.\" A sick visit is when you see a doctor because of a health concern or problem. Since physicals are scheduled ahead of time, you can think about what you want to ask the doctor.  How often should I get a physical? -- It depends on your age and health. In general, for people age 21 years and older:   If you are younger than 50 years, you might be able to get a physical every 3 years.   If you are 50 years or older, your doctor might recommend a physical every year.  If you have an ongoing health condition, like diabetes or high blood pressure, your doctor will probably want to see you more often.  What happens during a physical? -- In general, each visit will include:   Physical exam - The doctor or nurse will check your height, weight, heart rate, and blood pressure. They will also look at your eyes and ears. They will ask about how you are feeling and whether you have any symptoms that bother you.   Medicines - It's a good idea to bring a list of all the medicines you take to each doctor visit. Your doctor will talk to you about your medicines and answer any questions. Tell them if you are having any side effects that bother you. You " "should also tell them if you are having trouble paying for any of your medicines.   Habits and behaviors - This includes:   Your diet   Your exercise habits   Whether you smoke, drink alcohol, or use drugs   Whether you are sexually active   Whether you feel safe at home  Your doctor will talk to you about things you can do to improve your health and lower your risk of health problems. They will also offer help and support. For example, if you want to quit smoking, they can give you advice and might prescribe medicines. If you want to improve your diet or get more physical activity, they can help you with this, too.   Lab tests, if needed - The tests you get will depend on your age and situation. For example, your doctor might want to check your:   Cholesterol   Blood sugar   Iron level   Vaccines - The recommended vaccines will depend on your age, health, and what vaccines you already had. Vaccines are very important because they can prevent certain serious or deadly infections.   Discussion of screening - \"Screening\" means checking for diseases or other health problems before they cause symptoms. Your doctor can recommend screening based on your age, risk, and preferences. This might include tests to check for:   Cancer, such as breast, prostate, cervical, ovarian, colorectal, prostate, lung, or skin cancer   Sexually transmitted infections, such as chlamydia and gonorrhea   Mental health conditions like depression and anxiety  Your doctor will talk to you about the different types of screening tests. They can help you decide which screenings to have. They can also explain what the results might mean.   Answering questions - The physical is a good time to ask the doctor or nurse questions about your health. If needed, they can refer you to other doctors or specialists, too.  Adults older than 65 years often need other care, too. As you get older, your doctor will talk to you about:   How to prevent falling at " home   Hearing or vision tests   Memory testing   How to take your medicines safely   Making sure that you have the help and support you need at home  All topics are updated as new evidence becomes available and our peer review process is complete.  This topic retrieved from [employer] on: May 02, 2024.  Topic 315618 Version 1.0  Release: 32.4.3 - C32.122  © 2024 UpToDate, Inc. and/or its affiliates. All rights reserved.  Consumer Information Use and Disclaimer   Disclaimer: This generalized information is a limited summary of diagnosis, treatment, and/or medication information. It is not meant to be comprehensive and should be used as a tool to help the user understand and/or assess potential diagnostic and treatment options. It does NOT include all information about conditions, treatments, medications, side effects, or risks that may apply to a specific patient. It is not intended to be medical advice or a substitute for the medical advice, diagnosis, or treatment of a health care provider based on the health care provider's examination and assessment of a patient's specific and unique circumstances. Patients must speak with a health care provider for complete information about their health, medical questions, and treatment options, including any risks or benefits regarding use of medications. This information does not endorse any treatments or medications as safe, effective, or approved for treating a specific patient. UpToDate, Inc. and its affiliates disclaim any warranty or liability relating to this information or the use thereof.The use of this information is governed by the Terms of Use, available at https://www.woltersStorspeeduwer.com/en/know/clinical-effectiveness-terms. 2024© UpToDate, Inc. and its affiliates and/or licensors. All rights reserved.  Copyright   © 2024 UpToDate, Inc. and/or its affiliates. All rights reserved.

## 2025-02-19 NOTE — PROGRESS NOTES
Adult Annual Physical  Name: LYNDON Reed      : 1995      MRN: [id number]  Encounter Provider: ISAIAS Saravia  Encounter Date: 2025   Encounter department: [facility]    Assessment & Plan  Annual physical exam         Hypersomnia    Orders:    Ambulatory Referral to Sleep Medicine; Future    Mild intermittent asthma with exacerbation           Immunizations and preventive care screenings were discussed with patient today. Appropriate education was printed on patient's after visit summary.    Counseling:  Alcohol/drug use: discussed moderation in alcohol intake, the recommendations for healthy alcohol use, and avoidance of illicit drug use.  Dental Health: discussed importance of regular tooth brushing, flossing, and dental visits.  Injury prevention: discussed safety/seat belts, safety helmets, smoke detectors, carbon monoxide detectors, and smoking near bedding or upholstery.  Sexual health: discussed sexually transmitted diseases, partner selection, use of condoms, avoidance of unintended pregnancy, and contraceptive alternatives.  Exercise: the importance of regular exercise/physical activity was discussed. Recommend exercise 3-5 times per week for at least 30 minutes.          History of Present Illness     Adult Annual Physical:  Patient presents for annual physical. Here for wellness exam  Sleeping issues since childhood  Sleeping too much  .     Diet and Physical Activity:  - Diet/Nutrition: well balanced diet.  - Exercise: 3-4 times a week on average.    General Health:  - Sleep: sleeps well. sleeping too much  - Hearing: normal hearing right ear and normal hearing left ear.  - Vision: no vision problems.  - Dental: regular dental visits and brushes teeth once daily.    /GYN Health:  - Follows with GYN: yes.   - Menopause: premenopausal.   - History of STDs: no    Advanced Care Planning:  - Has an advanced directive?: no    - Has a durable medical POA?: no    -  ACP document given to patient?: no      Review of Systems   Constitutional:  Negative for fatigue and fever.   HENT:  Negative for congestion, postnasal drip and rhinorrhea.    Eyes:  Negative for photophobia and visual disturbance.   Respiratory:  Negative for cough, shortness of breath and wheezing.    Cardiovascular:  Negative for chest pain and palpitations.   Gastrointestinal:  Negative for constipation, diarrhea and vomiting.   Genitourinary:  Negative for dysuria and frequency.   Musculoskeletal:  Negative for arthralgias and myalgias.   Skin:  Negative for rash.   Neurological:  Negative for dizziness, light-headedness and headaches.   Hematological:  Negative for adenopathy.   Psychiatric/Behavioral:  Negative for dysphoric mood and sleep disturbance. The patient is not nervous/anxious.      Pertinent Medical History           Medical History Reviewed by provider this encounter:  Tobacco  Allergies  Meds  Problems  Med Hx  Surg Hx  Fam Hx     .  Past Medical History   Past Medical History:   Diagnosis Date    Allergic     Allergic rhinitis     Anesthesia complication     woke up in the middle of wisdom teeth removal    Anxiety 02/20/2018    Asthma     Asthma     Depression     Functional murmur 11/19/2012    History of back pain     History of depression     History of impacted cerumen     unspecified laterality    History of pharyngitis     History of streptococcal pharyngitis     History of tonsillitis     History of URI (upper respiratory infection)     Obesity 11/19/2012    Recurrent major depressive disorder, in partial remission (HCC) 02/20/2018    Seasonal allergies     Sinus problem     Toe fracture, left 02/18/2024    5th toe    Tooth wear     gem with dental bond-upper left    Viral illness 11/02/2023     Past Surgical History:   Procedure Laterality Date    ORIF FOOT FRACTURE Left 2/27/2024    Procedure: LEFT FOOT 5TH METATARSAL OPEN REDUCTION WITH INTERNAL FIXATION;  Surgeon: Jamshid  DAVID Escobar;  Location: [facility];  Service: Podiatry    WISDOM TOOTH EXTRACTION       Family History   Problem Relation Age of Onset    Hypothyroidism Mother     Osteoporosis Mother     Breast cancer Mother 50    Thyroid disease Mother     Diabetes Father     Arthritis Father     Asthma Father     Heart defect Other         Cardiac disorder    Mental illness Neg Hx         Mother      reports that she has quit smoking. Her smoking use included cigarettes. She has never been exposed to tobacco smoke. She has never used smokeless tobacco. She reports that she does not currently use alcohol. She reports that she does not currently use drugs after having used the following drugs: Cocaine.  Current Outpatient Medications   Medication Instructions    albuterol (Ventolin HFA) 90 mcg/act inhaler 2 puffs, Inhalation, Every 4 hours PRN    Clocortolone Pivalate (Cloderm) 0.1 % cream Topical, 2 times daily, To affected area    Elderberry 500 MG CAPS Take by mouth    fexofenadine (ALLEGRA) 180 MG tablet Take by mouth    FLUoxetine 20 mg, Oral, Daily    MAGNESIUM PO Daily at bedtime    mometasone (Asmanex, 30 Metered Doses,) 110 mcg/actuation AEPB inhaler 2 puffs, Inhalation, Every evening, Rinse mouth after use.    Multiple Vitamin (multivitamin) capsule 1 capsule, Daily    norethindrone-ethinyl estradiol (Loestrin Fe 1/20) 1-20 MG-MCG per tablet 1 tablet, Oral, Daily    Omega-3 Fatty Acids (FISH OIL PO) Daily at bedtime    ondansetron (ZOFRAN) 4 mg, Oral, Every 6 hours    Semaglutide-Weight Management (Wegovy) 2.4 MG/0.75ML Inject 2.4 mg under the skin weekly     Allergies   Allergen Reactions    Pineapple - Food Allergy Anaphylaxis     Itching/closing of the throat    Amoxicillin GI Intolerance    Mold Extract [Trichophyton] Allergic Rhinitis    Seasonal Ic [Cholestatin] Allergic Rhinitis      Current Outpatient Medications on File Prior to Visit   Medication Sig Dispense Refill    albuterol (Ventolin HFA) 90 mcg/act inhaler  Inhale 2 puffs every 4 (four) hours as needed for wheezing 18 g 5    Clocortolone Pivalate (Cloderm) 0.1 % cream Apply topically 2 (two) times a day To affected area 45 g 0    Elderberry 500 MG CAPS Take by mouth      fexofenadine (ALLEGRA) 180 MG tablet Take by mouth      FLUoxetine (PROzac) 20 MG tablet Take 1 tablet (20 mg total) by mouth daily 90 tablet 3    MAGNESIUM PO Take by mouth daily at bedtime      Multiple Vitamin (multivitamin) capsule Take 1 capsule by mouth daily      norethindrone-ethinyl estradiol (Loestrin Fe 1/20) 1-20 MG-MCG per tablet Take 1 tablet by mouth daily 84 tablet 0    Omega-3 Fatty Acids (FISH OIL PO) Take by mouth daily at bedtime      ondansetron (ZOFRAN) 4 mg tablet Take 1 tablet (4 mg total) by mouth every 6 (six) hours 12 tablet 0    Semaglutide-Weight Management (Wegovy) 2.4 MG/0.75ML Inject 2.4 mg under the skin weekly 3 mL 5    mometasone (Asmanex, 30 Metered Doses,) 110 mcg/actuation AEPB inhaler Inhale 2 puffs every evening Rinse mouth after use. (Patient not taking: Reported on 2/19/2025) 1 each 1    [DISCONTINUED] benzoyl peroxide 5 % external liquid Apply topically 2 (two) times a day (Patient not taking: Reported on 2/19/2025) 236 mL 5    [DISCONTINUED] clindamycin 1 % gel Apply topically 2 (two) times a day (Patient not taking: Reported on 2/19/2025) 60 g 5    [DISCONTINUED] nirmatrelvir & ritonavir (Paxlovid, 300/100,) tablet therapy pack Take 3 tablets by mouth 2 (two) times a day for 5 days Take 2 nirmatrelvir tablets + 1 ritonavir tablet together per dose (Patient not taking: Reported on 2/19/2025) 30 tablet 0     No current facility-administered medications on file prior to visit.      Social History     Tobacco Use    Smoking status: Former     Types: Cigarettes     Passive exposure: Never    Smokeless tobacco: Never    Tobacco comments:     History social smoking 2012 quit   Vaping Use    Vaping status: Never Used   Substance and Sexual Activity    Alcohol use:  "Not Currently    Drug use: Not Currently     Types: Cocaine     Comment: quit 2016    Sexual activity: Yes     Partners: Male     Birth control/protection: OCP       Objective   /80 (BP Location: Left arm, Patient Position: Sitting, Cuff Size: Standard)   Pulse 87   Temp 98.1 °F (36.7 °C) (Tympanic)   Resp 17   Ht 5' 1\" (1.549 m)   Wt 84.4 kg (186 lb)   LMP 02/14/2025 Comment: on her period now  SpO2 98%   BMI 35.14 kg/m²     Physical Exam  Vitals and nursing note reviewed.   Constitutional:       Appearance: Normal appearance.   HENT:      Head: Normocephalic and atraumatic.      Right Ear: Tympanic membrane, ear canal and external ear normal.      Left Ear: Tympanic membrane, ear canal and external ear normal.      Nose: Nose normal.      Mouth/Throat:      Mouth: Mucous membranes are moist.   Eyes:      Conjunctiva/sclera: Conjunctivae normal.   Cardiovascular:      Rate and Rhythm: Normal rate and regular rhythm.      Heart sounds: Normal heart sounds.   Pulmonary:      Effort: Pulmonary effort is normal.      Breath sounds: Normal breath sounds.   Abdominal:      General: Bowel sounds are normal.      Palpations: Abdomen is soft.   Musculoskeletal:         General: Normal range of motion.      Cervical back: Normal range of motion and neck supple.   Skin:     General: Skin is warm and dry.      Capillary Refill: Capillary refill takes less than 2 seconds.   Neurological:      General: No focal deficit present.      Mental Status: She is alert and oriented to person, place, and time.   Psychiatric:         Mood and Affect: Mood normal.         Behavior: Behavior normal.         Thought Content: Thought content normal.         Judgment: Judgment normal.         "

## 2025-02-24 PROBLEM — J45.21 MILD INTERMITTENT ASTHMA WITH EXACERBATION: Status: ACTIVE | Noted: 2025-02-24

## 2025-02-24 PROBLEM — G47.10 HYPERSOMNIA: Status: ACTIVE | Noted: 2025-02-24

## 2025-03-05 ENCOUNTER — OFFICE VISIT (OUTPATIENT)
Age: 30
End: 2025-03-05
Payer: COMMERCIAL

## 2025-03-05 VITALS — TEMPERATURE: 98 F | WEIGHT: 186 LBS | BODY MASS INDEX: 35.14 KG/M2

## 2025-03-05 DIAGNOSIS — D22.5 MULTIPLE BENIGN MELANOCYTIC NEVI OF UPPER EXTREMITY, LOWER EXTREMITY, AND TRUNK: ICD-10-CM

## 2025-03-05 DIAGNOSIS — D22.70 MULTIPLE BENIGN MELANOCYTIC NEVI OF UPPER EXTREMITY, LOWER EXTREMITY, AND TRUNK: ICD-10-CM

## 2025-03-05 DIAGNOSIS — L72.0 MILIA: Primary | ICD-10-CM

## 2025-03-05 DIAGNOSIS — D22.60 MULTIPLE BENIGN MELANOCYTIC NEVI OF UPPER EXTREMITY, LOWER EXTREMITY, AND TRUNK: ICD-10-CM

## 2025-03-05 PROCEDURE — 99203 OFFICE O/P NEW LOW 30 MIN: CPT | Performed by: REGISTERED NURSE

## 2025-03-05 NOTE — PROGRESS NOTES
"[facility] Dermatology Clinic Note     Patient Name: LYNDON Reed  Encounter Date: 3/5/25     Have you been cared for by a [facility] Dermatologist in the last 3 years and, if so, which description applies to you?    NO.   I am considered a \"new\" patient and must complete all patient intake questions. I am FEMALE/of child-bearing potential.    REVIEW OF SYSTEMS:  Have you recently had or currently have any of the following? Recent fever or chills? No  Any non-healing wound? No  Are you pregnant or planning to become pregnant? No  Are you currently or planning to be nursing or breast feeding? No   PAST MEDICAL HISTORY:  Have you personally ever had or currently have any of the following?  If \"YES,\" then please provide more detail. Skin cancer (such as Melanoma, Basal Cell Carcinoma, Squamous Cell Carcinoma?  No  Tuberculosis, HIV/AIDS, Hepatitis B or C: No  Radiation Treatment No   HISTORY OF IMMUNOSUPPRESSION:   Do you have a history of any of the following:  Systemic Immunosuppression such as Diabetes, Biologic or Immunotherapy, Chemotherapy, Organ Transplantation, Bone Marrow Transplantation or Prednsione?  No    Answering \"YES\" requires the addition of the dotphrase \"IMMUNOSUPPRESSED\" as the first diagnosis of the patient's visit.   FAMILY HISTORY:  Any \"first degree relatives\" (parent, brother, sister, or child) with the following?    Skin Cancer, Pancreatic or Other Cancer? YES, father - BCC, mother - breast cancer   PATIENT EXPERIENCE:    Do you want the Dermatologist to perform a COMPLETE skin exam today including a clinical examination under the \"bra and underwear\" areas?  NO  If necessary, do we have your permission to call and leave a detailed message on your Preferred Phone number that includes your specific medical information?  Yes      Allergies   Allergen Reactions    Pineapple - Food Allergy Anaphylaxis     Itching/closing of the throat    Amoxicillin GI Intolerance    Mold Extract " [Trichophyton] Allergic Rhinitis    Seasonal Ic [Cholestatin] Allergic Rhinitis      Current Outpatient Medications:     albuterol (Ventolin HFA) 90 mcg/act inhaler, Inhale 2 puffs every 4 (four) hours as needed for wheezing, Disp: 18 g, Rfl: 5    Clocortolone Pivalate (Cloderm) 0.1 % cream, Apply topically 2 (two) times a day To affected area, Disp: 45 g, Rfl: 0    Elderberry 500 MG CAPS, Take by mouth, Disp: , Rfl:     fexofenadine (ALLEGRA) 180 MG tablet, Take by mouth, Disp: , Rfl:     FLUoxetine (PROzac) 20 MG tablet, Take 1 tablet (20 mg total) by mouth daily, Disp: 90 tablet, Rfl: 3    MAGNESIUM PO, Take by mouth daily at bedtime, Disp: , Rfl:     mometasone (Asmanex, 30 Metered Doses,) 110 mcg/actuation AEPB inhaler, Inhale 2 puffs every evening Rinse mouth after use. (Patient not taking: Reported on 2/19/2025), Disp: 1 each, Rfl: 1    Multiple Vitamin (multivitamin) capsule, Take 1 capsule by mouth daily, Disp: , Rfl:     norethindrone-ethinyl estradiol (Loestrin Fe 1/20) 1-20 MG-MCG per tablet, Take 1 tablet by mouth daily, Disp: 84 tablet, Rfl: 0    Omega-3 Fatty Acids (FISH OIL PO), Take by mouth daily at bedtime, Disp: , Rfl:     ondansetron (ZOFRAN) 4 mg tablet, Take 1 tablet (4 mg total) by mouth every 6 (six) hours, Disp: 12 tablet, Rfl: 0    Semaglutide-Weight Management (Wegovy) 2.4 MG/0.75ML, Inject 2.4 mg under the skin weekly, Disp: 3 mL, Rfl: 5        Whom besides the patient is providing clinical information about today's encounter?   NO ADDITIONAL HISTORIAN (patient alone provided history)    Physical Exam and Assessment/Plan by Diagnosis:    MILIUM   Physical Exam:  Anatomic Location Affected:  around eyes, lash lines   Morphological Description:  small white papules  Pertinent Positives:  Pertinent Negatives:    Additional History of Present Condition:  patient would like these to be removed if possible.    Assessment and Plan:  Based on a thorough discussion of this condition and the  "management approach to it (including a comprehensive discussion of the known risks, side effects and potential benefits of treatment), the patient (family) agrees to implement the following specific plan:  Reassured benign.  Discussed that they are too close to eye for us to remove   Recommend consulting the [facility] or an ophthalmologist for removal.          MULTIPLE MELANOCYTIC NEVI (\"Moles\")  Physical Exam:  Anatomic Location Affected: Trunk and upper extremities  Morphological Description:  Scattered, round to ovoid, symmetrical-appearing, even bordered, skin colored to dark brown macules/papules  Denies pain, itch, bleeding. No treatments tried. Present for years. Present constantly; no modifying factors which make it worse or better. Denies actively changing or growing moles.      Assessment and Plan:  Based on a thorough discussion of this condition and the management approach to it (including a comprehensive discussion of the known risks, side effects and potential benefits of treatment), the patient (family) agrees to implement the following specific plan:  Reassure benign  Monitor for changes  Use sun protection.  Apply SPF 30 or higher at least three times a day.  Wear sun protecting clothing and hats.    Worrisome signs of skin malignancy discussed, questions answered. Regular self-skin check discussed. Advised to call or return to office if patient notices any spots of concern, rapidly growing/changing lesions, bleeding lesions, non-healing lesions. Advised regular SPF use.        Scribe Attestation      I,:  Heavenly Hdez MA am acting as a scribe while in the presence of the attending physician.:       I,:  Obey Valderrama MD personally performed the services described in this documentation    as scribed in my presence.:           "

## 2025-04-08 DIAGNOSIS — Z30.09 BIRTH CONTROL COUNSELING: ICD-10-CM

## 2025-04-08 RX ORDER — NORETHINDRONE ACETATE AND ETHINYL ESTRADIOL AND FERROUS FUMARATE 1MG-20(21)
1 KIT ORAL DAILY
Qty: 28 TABLET | Refills: 0 | Status: SHIPPED | OUTPATIENT
Start: 2025-04-08

## 2025-04-08 NOTE — TELEPHONE ENCOUNTER
Patient has appt scheduled for [facility]. [facility] is far out booked and patient needed appt and birth controled refilled. Please advise.Patient is transferring care.

## 2025-04-10 ENCOUNTER — OFFICE VISIT (OUTPATIENT)
Dept: FAMILY MEDICINE CLINIC | Facility: CLINIC | Age: 30
End: 2025-04-10
Payer: COMMERCIAL

## 2025-04-10 VITALS
RESPIRATION RATE: 17 BRPM | SYSTOLIC BLOOD PRESSURE: 120 MMHG | DIASTOLIC BLOOD PRESSURE: 70 MMHG | WEIGHT: 183 LBS | HEIGHT: 61 IN | OXYGEN SATURATION: 98 % | TEMPERATURE: 97.8 F | HEART RATE: 94 BPM | BODY MASS INDEX: 34.55 KG/M2

## 2025-04-10 DIAGNOSIS — J45.20 MILD INTERMITTENT ASTHMA WITHOUT COMPLICATION: ICD-10-CM

## 2025-04-10 DIAGNOSIS — J30.1 SEASONAL ALLERGIC RHINITIS DUE TO POLLEN: Primary | ICD-10-CM

## 2025-04-10 PROCEDURE — 99214 OFFICE O/P EST MOD 30 MIN: CPT | Performed by: NURSE PRACTITIONER

## 2025-04-10 RX ORDER — FLUTICASONE PROPIONATE 50 MCG
1 SPRAY, SUSPENSION (ML) NASAL DAILY
Qty: 15.8 ML | Refills: 5 | Status: SHIPPED | OUTPATIENT
Start: 2025-04-10

## 2025-04-10 RX ORDER — MONTELUKAST SODIUM 10 MG/1
10 TABLET ORAL
Qty: 90 TABLET | Refills: 3 | Status: SHIPPED | OUTPATIENT
Start: 2025-04-10

## 2025-04-10 RX ORDER — LEVOCETIRIZINE DIHYDROCHLORIDE 5 MG/1
5 TABLET, FILM COATED ORAL EVERY EVENING
Qty: 90 TABLET | Refills: 1 | Status: SHIPPED | OUTPATIENT
Start: 2025-04-10

## 2025-04-10 NOTE — ASSESSMENT & PLAN NOTE
Orders:    montelukast (SINGULAIR) 10 mg tablet; Take 1 tablet (10 mg total) by mouth daily at bedtime    fluticasone (FLONASE) 50 mcg/act nasal spray; 1 spray into each nostril daily    levocetirizine (XYZAL) 5 MG tablet; Take 1 tablet (5 mg total) by mouth every evening

## 2025-04-10 NOTE — ASSESSMENT & PLAN NOTE
To add singlulair   Cont inhalers    Orders:    montelukast (SINGULAIR) 10 mg tablet; Take 1 tablet (10 mg total) by mouth daily at bedtime

## 2025-04-10 NOTE — PROGRESS NOTES
Name: LYNDON Reed      : 1995      MRN: [id number]  Encounter Provider: ISAIAS Saravia  Encounter Date: 4/10/2025   Encounter department: [facility]    Assessment & Plan  Seasonal allergic rhinitis due to pollen    Orders:    montelukast (SINGULAIR) 10 mg tablet; Take 1 tablet (10 mg total) by mouth daily at bedtime    fluticasone (FLONASE) 50 mcg/act nasal spray; 1 spray into each nostril daily    levocetirizine (XYZAL) 5 MG tablet; Take 1 tablet (5 mg total) by mouth every evening    Mild intermittent asthma without complication  To add singlulair   Cont inhalers    Orders:    montelukast (SINGULAIR) 10 mg tablet; Take 1 tablet (10 mg total) by mouth daily at bedtime         History of Present Illness     Started to get sick Friday  Pnd  Sore throat  Scratchy throat  Congestion  Sinus pressure   Ha  Sneezing   Coughing  No testing done at home  Tried mucinex, sinus meds  Dayquil        Review of Systems   Constitutional:  Negative for fatigue and fever.   HENT:  Positive for congestion, postnasal drip, rhinorrhea and sore throat. Negative for ear pain, sinus pressure and sinus pain.    Respiratory:  Positive for cough. Negative for shortness of breath and wheezing.    Cardiovascular:  Negative for chest pain and palpitations.   Gastrointestinal:  Negative for constipation, diarrhea, nausea and vomiting.   Genitourinary:  Negative for dysuria, frequency and urgency.   Musculoskeletal:  Negative for arthralgias and myalgias.   Neurological:  Negative for dizziness, numbness and headaches.   Hematological:  Negative for adenopathy.   Psychiatric/Behavioral:  Negative for dysphoric mood and sleep disturbance. The patient is not nervous/anxious.      Past Medical History:   Diagnosis Date    Allergic     Allergic rhinitis     Anesthesia complication     woke up in the middle of wisdom teeth removal    Anxiety 2018    Asthma     Asthma     Depression     Functional murmur  11/19/2012    History of back pain     History of depression     History of impacted cerumen     unspecified laterality    History of pharyngitis     History of streptococcal pharyngitis     History of tonsillitis     History of URI (upper respiratory infection)     Obesity 11/19/2012    Recurrent major depressive disorder, in partial remission (HCC) 02/20/2018    Seasonal allergies     Sinus problem     Toe fracture, left 02/18/2024    5th toe    Tooth wear     gem with dental bond-upper left    Viral illness 11/02/2023     Past Surgical History:   Procedure Laterality Date    ORIF FOOT FRACTURE Left 2/27/2024    Procedure: LEFT FOOT 5TH METATARSAL OPEN REDUCTION WITH INTERNAL FIXATION;  Surgeon: Jamshid Escobar DPM;  Location: [facility];  Service: Podiatry    WISDOM TOOTH EXTRACTION       Family History   Problem Relation Age of Onset    Hypothyroidism Mother     Osteoporosis Mother     Breast cancer Mother 50    Thyroid disease Mother     Diabetes Father     Arthritis Father     Asthma Father     Heart defect Other         Cardiac disorder    Mental illness Neg Hx         Mother     Social History     Tobacco Use    Smoking status: Former     Types: Cigarettes     Passive exposure: Never    Smokeless tobacco: Never    Tobacco comments:     History social smoking 2012 quit   Vaping Use    Vaping status: Never Used   Substance and Sexual Activity    Alcohol use: Not Currently    Drug use: Not Currently     Types: Cocaine     Comment: quit 2016    Sexual activity: Yes     Partners: Male     Birth control/protection: OCP     Current Outpatient Medications on File Prior to Visit   Medication Sig    albuterol (Ventolin HFA) 90 mcg/act inhaler Inhale 2 puffs every 4 (four) hours as needed for wheezing    Clocortolone Pivalate (Cloderm) 0.1 % cream Apply topically 2 (two) times a day To affected area    Elderberry 500 MG CAPS Take by mouth    fexofenadine (ALLEGRA) 180 MG tablet Take by mouth    FLUoxetine (PROzac) 20 MG  "tablet Take 1 tablet (20 mg total) by mouth daily    MAGNESIUM PO Take by mouth daily at bedtime    mometasone (Asmanex, 30 Metered Doses,) 110 mcg/actuation AEPB inhaler Inhale 2 puffs every evening Rinse mouth after use.    Multiple Vitamin (multivitamin) capsule Take 1 capsule by mouth daily    norethindrone-ethinyl estradiol (Loestrin Fe 1/20) 1-20 MG-MCG per tablet Take 1 tablet by mouth daily    Omega-3 Fatty Acids (FISH OIL PO) Take by mouth daily at bedtime    ondansetron (ZOFRAN) 4 mg tablet Take 1 tablet (4 mg total) by mouth every 6 (six) hours    Semaglutide-Weight Management (Wegovy) 2.4 MG/0.75ML Inject 2.4 mg under the skin weekly     Allergies   Allergen Reactions    Pineapple - Food Allergy Anaphylaxis     Itching/closing of the throat    Amoxicillin GI Intolerance    Mold Extract [Trichophyton] Allergic Rhinitis    Seasonal Ic [Cholestatin] Allergic Rhinitis     Immunization History   Administered Date(s) Administered    COVID-19 MODERNA VACC 0.5 ML IM 02/12/2021, 03/12/2021, 12/16/2021    COVID-19 Pfizer mRNA vacc PF jackie-sucrose 12 yr and older (Comirnaty) 10/09/2024    H1N1, All Formulations 10/31/2009    HPV Quadrivalent 08/24/2010, 10/26/2010, 02/03/2011    INFLUENZA 09/25/2015, 10/15/2016, 08/15/2019, 10/29/2020, 10/09/2024    Influenza Quadrivalent, 6-35 Months IM 10/15/2016    Influenza, injectable, quadrivalent, preservative free 0.5 mL 10/19/2018, 10/04/2019, 09/15/2021, 10/06/2022    Influenza, recombinant, quadrivalent,injectable, preservative free 10/29/2020    Influenza, seasonal, injectable 09/25/2015    Meningococcal, Unknown Serogroups 08/01/2008    Pneumococcal Conjugate Vaccine 20-valent (Pcv20), Polysace 01/18/2023    Tdap 08/01/2008, 02/18/2016     Objective   /70 (BP Location: Left arm, Patient Position: Sitting, Cuff Size: Standard)   Pulse 94   Temp 97.8 °F (36.6 °C) (Tympanic)   Resp 17   Ht 5' 1\" (1.549 m)   Wt 83 kg (183 lb)   SpO2 98%   BMI 34.58 kg/m² "     Physical Exam  Vitals and nursing note reviewed.   Constitutional:       Appearance: Normal appearance.   HENT:      Head: Normocephalic and atraumatic.      Right Ear: Tympanic membrane, ear canal and external ear normal.      Left Ear: Tympanic membrane, ear canal and external ear normal.      Nose: Congestion and rhinorrhea present.      Mouth/Throat:      Pharynx: Posterior oropharyngeal erythema present.   Eyes:      Conjunctiva/sclera: Conjunctivae normal.   Cardiovascular:      Rate and Rhythm: Normal rate and regular rhythm.      Heart sounds: Normal heart sounds.   Pulmonary:      Effort: Pulmonary effort is normal.      Breath sounds: Normal breath sounds.   Abdominal:      General: Bowel sounds are normal.   Musculoskeletal:         General: Normal range of motion.      Cervical back: Normal range of motion and neck supple.   Skin:     General: Skin is warm and dry.      Capillary Refill: Capillary refill takes less than 2 seconds.   Neurological:      General: No focal deficit present.      Mental Status: She is alert and oriented to person, place, and time.   Psychiatric:         Mood and Affect: Mood normal.         Behavior: Behavior normal.

## 2025-05-05 DIAGNOSIS — F41.9 ANXIETY: ICD-10-CM

## 2025-05-05 DIAGNOSIS — F33.0 MILD EPISODE OF RECURRENT MAJOR DEPRESSIVE DISORDER (HCC): ICD-10-CM

## 2025-05-06 RX ORDER — FLUOXETINE 20 MG/1
20 TABLET, FILM COATED ORAL DAILY
Qty: 90 TABLET | Refills: 1 | Status: SHIPPED | OUTPATIENT
Start: 2025-05-06

## 2025-05-23 ENCOUNTER — OFFICE VISIT (OUTPATIENT)
Dept: FAMILY MEDICINE CLINIC | Facility: CLINIC | Age: 30
End: 2025-05-23
Payer: COMMERCIAL

## 2025-05-23 VITALS
HEART RATE: 80 BPM | RESPIRATION RATE: 18 BRPM | TEMPERATURE: 98.6 F | SYSTOLIC BLOOD PRESSURE: 102 MMHG | BODY MASS INDEX: 34.16 KG/M2 | WEIGHT: 185.6 LBS | OXYGEN SATURATION: 98 % | DIASTOLIC BLOOD PRESSURE: 78 MMHG | HEIGHT: 62 IN

## 2025-05-23 DIAGNOSIS — Z00.00 ANNUAL PHYSICAL EXAM: Primary | ICD-10-CM

## 2025-05-23 PROCEDURE — 99395 PREV VISIT EST AGE 18-39: CPT | Performed by: NURSE PRACTITIONER

## 2025-05-23 NOTE — PROGRESS NOTES
Adult Annual Physical  Name: LYNDON Rede      : 1995      MRN: [id number]  Encounter Provider: ISAIAS Saravia  Encounter Date: 2025   Encounter department: [facility]    :  Assessment & Plan  Annual physical exam             Preventive Screenings:  - Diabetes Screening: screening up-to-date  - Cholesterol Screening: orders placed   - Hepatitis C screening: screening up-to-date   - HIV screening: screening not indicated   - Cervical cancer screening: screening up-to-date   - Colon cancer screening: screening not indicated   - Lung cancer screening: screening not indicated     Counseling/Anticipatory Guidance:    - Dental health: discussed importance of regular tooth brushing, flossing, and dental visits.   - Diet: discussed recommendations for a healthy/well-balanced diet.   - Exercise: the importance of regular exercise/physical activity was discussed. Recommend exercise 3-5 times per week for at least 30 minutes.   - Injury prevention: discussed safety/seat belts, safety helmets, smoke detectors, carbon monoxide detectors, and smoking near bedding or upholstery.       Depression Screening and Follow-up Plan: Patient's depression screening was positive with a PHQ-9 score of 13.   Patient assessed for underlying major depression. Brief counseling provided and recommend additional follow-up/re-evaluation next office visit. Patient with underlying depression and was advised to continue current medications as prescribed.         History of Present Illness     Adult Annual Physical:  Patient presents for annual physical. Here for wellness exam  .     Diet and Physical Activity:  - Diet/Nutrition: no special diet and well balanced diet.  - Exercise: no formal exercise.    Depression Screening:    - PHQ-9 Score: 13    General Health:  - Sleep: > 8 hours of sleep on average.  - Hearing: tinnitus.  - Vision: no vision problems.  - Dental: regular dental visits.    /GYN Health:  -  Follows with GYN: yes.   - Menopause: premenopausal.   - Last menstrual cycle: 5/9/2025.   - History of STDs: no  - Contraception: oral contraceptives.      Advanced Care Planning:  - Has an advanced directive?: yes    - Has a durable medical POA?: no    - ACP document given to patient?: no      Review of Systems   Constitutional:  Negative for fatigue and fever.   HENT:  Negative for congestion, postnasal drip and rhinorrhea.    Eyes:  Negative for photophobia and visual disturbance.   Respiratory:  Negative for cough, shortness of breath and wheezing.    Cardiovascular:  Negative for chest pain and palpitations.   Gastrointestinal:  Negative for constipation, diarrhea, nausea and vomiting.   Genitourinary:  Negative for dysuria and frequency.   Musculoskeletal:  Negative for arthralgias and myalgias.   Skin:  Negative for rash.   Neurological:  Negative for dizziness, light-headedness and headaches.   Hematological:  Negative for adenopathy.   Psychiatric/Behavioral:  Negative for dysphoric mood and sleep disturbance. The patient is not nervous/anxious.      Pertinent Medical History           Medical History Reviewed by provider this encounter:  Tobacco  Allergies  Meds  Problems  Med Hx  Surg Hx  Fam Hx     .  Past Medical History   Past Medical History[1]  Past Surgical History[2]  Family History[3]   reports that she has quit smoking. Her smoking use included cigarettes. She has never been exposed to tobacco smoke. She has never used smokeless tobacco. She reports that she does not currently use alcohol. She reports that she does not currently use drugs after having used the following drugs: Cocaine.  Current Outpatient Medications   Medication Instructions    albuterol (Ventolin HFA) 90 mcg/act inhaler 2 puffs, Inhalation, Every 4 hours PRN    Clocortolone Pivalate (Cloderm) 0.1 % cream Topical, 2 times daily, To affected area    Elderberry 500 MG CAPS Take by mouth    fexofenadine (ALLEGRA) 180 MG  "tablet Take by mouth    FLUoxetine 20 mg, Oral, Daily    fluticasone (FLONASE) 50 mcg/act nasal spray 1 spray, Nasal, Daily    levocetirizine (XYZAL) 5 mg, Oral, Every evening    MAGNESIUM PO Daily at bedtime    mometasone (Asmanex, 30 Metered Doses,) 110 mcg/actuation AEPB inhaler 2 puffs, Inhalation, Every evening, Rinse mouth after use.    montelukast (SINGULAIR) 10 mg, Oral, Daily at bedtime    Multiple Vitamin (multivitamin) capsule 1 capsule, Daily    norethindrone-ethinyl estradiol (Loestrin Fe 1/20) 1-20 MG-MCG per tablet 1 tablet, Oral, Daily    Omega-3 Fatty Acids (FISH OIL PO) Daily at bedtime    ondansetron (ZOFRAN) 4 mg, Oral, Every 6 hours    Semaglutide-Weight Management (Wegovy) 2.4 MG/0.75ML Inject 2.4 mg under the skin weekly   Allergies[4]   Medications Ordered Prior to Encounter[5]   Social History[6]    Objective   /78 (BP Location: Left arm, Patient Position: Sitting, Cuff Size: Standard)   Pulse 80   Temp 98.6 °F (37 °C) (Tympanic)   Resp 18   Ht 5' 1.89\" (1.572 m)   Wt 84.2 kg (185 lb 9.6 oz)   LMP 05/09/2025   SpO2 98%   BMI 34.07 kg/m²     Physical Exam  Vitals and nursing note reviewed.   Constitutional:       Appearance: Normal appearance.   HENT:      Head: Atraumatic.      Right Ear: Tympanic membrane, ear canal and external ear normal.      Left Ear: Tympanic membrane, ear canal and external ear normal.      Nose: Nose normal.      Mouth/Throat:      Mouth: Mucous membranes are moist.     Eyes:      Conjunctiva/sclera: Conjunctivae normal.       Cardiovascular:      Rate and Rhythm: Normal rate and regular rhythm.      Heart sounds: Normal heart sounds.   Pulmonary:      Effort: Pulmonary effort is normal.      Breath sounds: Normal breath sounds.   Abdominal:      Palpations: Abdomen is soft.     Musculoskeletal:         General: Normal range of motion.      Cervical back: Normal range of motion and neck supple.     Skin:     General: Skin is warm and dry.      Capillary " Refill: Capillary refill takes less than 2 seconds.     Neurological:      General: No focal deficit present.      Mental Status: She is alert and oriented to person, place, and time.     Psychiatric:         Mood and Affect: Mood normal.         Behavior: Behavior normal.         Thought Content: Thought content normal.         Judgment: Judgment normal.              [1]   Past Medical History:  Diagnosis Date    Acne     Allergic     Allergic rhinitis     Anesthesia complication     woke up in the middle of wisdom teeth removal    Anxiety 02/20/2018    Asthma     Asthma     Depression     Functional murmur 11/19/2012    History of back pain     History of depression     History of impacted cerumen     unspecified laterality    History of pharyngitis     History of streptococcal pharyngitis     History of tonsillitis     History of URI (upper respiratory infection)     Obesity 11/19/2012    Recurrent major depressive disorder, in partial remission (HCC) 02/20/2018    Seasonal allergies     Sinus problem     Tinnitus     TMJ dysfunction     Toe fracture, left 02/18/2024    5th toe    Tooth wear     gem with dental bond-upper left    Viral illness 11/02/2023   [2]   Past Surgical History:  Procedure Laterality Date    ORIF FOOT FRACTURE Left 2/27/2024    Procedure: LEFT FOOT 5TH METATARSAL OPEN REDUCTION WITH INTERNAL FIXATION;  Surgeon: Jamshid Escobar DPM;  Location: [facility];  Service: Podiatry    WISDOM TOOTH EXTRACTION     [3]   Family History  Problem Relation Name Age of Onset    Hypothyroidism Mother Saroj     Osteoporosis Mother Saroj     Breast cancer Mother Saroj 50    Thyroid disease Mother Saroj     Cancer Mother Saroj         Breast cancer    Diabetes Father Aubrye Reed     Arthritis Father Aubrey Reed     Asthma Father Aubrey Reed     Cancer Father Aubrey Reed         Basal    Heart defect Other Grandfather         Cardiac disorder    Mental illness Neg Hx           Mother   [4]   Allergies  Allergen Reactions    Pineapple - Food Allergy Anaphylaxis     Itching/closing of the throat    Amoxicillin GI Intolerance    Mold Extract [Trichophyton] Allergic Rhinitis    Seasonal Ic [Cholestatin] Allergic Rhinitis   [5]   Current Outpatient Medications on File Prior to Visit   Medication Sig Dispense Refill    albuterol (Ventolin HFA) 90 mcg/act inhaler Inhale 2 puffs every 4 (four) hours as needed for wheezing 18 g 5    Clocortolone Pivalate (Cloderm) 0.1 % cream Apply topically 2 (two) times a day To affected area 45 g 0    Elderberry 500 MG CAPS Take by mouth      FLUoxetine 20 MG tablet Take 1 tablet (20 mg total) by mouth daily 90 tablet 1    fluticasone (FLONASE) 50 mcg/act nasal spray 1 spray into each nostril daily 15.8 mL 5    levocetirizine (XYZAL) 5 MG tablet Take 1 tablet (5 mg total) by mouth every evening 90 tablet 1    MAGNESIUM PO Take by mouth daily at bedtime      mometasone (Asmanex, 30 Metered Doses,) 110 mcg/actuation AEPB inhaler Inhale 2 puffs every evening Rinse mouth after use. 1 each 1    montelukast (SINGULAIR) 10 mg tablet Take 1 tablet (10 mg total) by mouth daily at bedtime 90 tablet 3    Multiple Vitamin (multivitamin) capsule Take 1 capsule by mouth in the morning.      norethindrone-ethinyl estradiol (Loestrin Fe 1/20) 1-20 MG-MCG per tablet Take 1 tablet by mouth daily 28 tablet 0    Omega-3 Fatty Acids (FISH OIL PO) Take by mouth daily at bedtime      ondansetron (ZOFRAN) 4 mg tablet Take 1 tablet (4 mg total) by mouth every 6 (six) hours 12 tablet 0    Semaglutide-Weight Management (Wegovy) 2.4 MG/0.75ML Inject 2.4 mg under the skin weekly 3 mL 5    fexofenadine (ALLEGRA) 180 MG tablet Take by mouth (Patient not taking: Reported on 5/23/2025)       No current facility-administered medications on file prior to visit.   [6]   Social History  Tobacco Use    Smoking status: Former     Types: Cigarettes     Passive exposure: Never    Smokeless tobacco:  Never    Tobacco comments:     History social smoking 2012 quit   Vaping Use    Vaping status: Never Used   Substance and Sexual Activity    Alcohol use: Not Currently    Drug use: Not Currently     Types: Cocaine     Comment: quit 2016    Sexual activity: Yes     Partners: Male     Birth control/protection: OCP

## 2025-05-27 ENCOUNTER — TELEPHONE (OUTPATIENT)
Dept: FAMILY MEDICINE CLINIC | Facility: CLINIC | Age: 30
End: 2025-05-27

## 2025-05-27 NOTE — TELEPHONE ENCOUNTER
Patient called in and would like to have the Zepbound sent to \[facility]\"". She was having issues creating an account through whatever portal the doctor wanted her to create one through so she would rather just have it sent in to a pharmacy.    She would like for the doctor to put a note that she will be paying for this out of pocket each month so it doesn't need to go through her insurance.     She would like it sent to \[facility]\"" Pharmacy in [place].

## 2025-05-27 NOTE — TELEPHONE ENCOUNTER
Patient is aware you are out of the office. No order in chart for Zepbound to send to different pharmacy.    Please advise.

## 2025-05-28 NOTE — PATIENT INSTRUCTIONS
"Patient Education     Routine physical for adults   The Basics   Written by the doctors and editors at [facility]   What is a physical? -- A physical is a routine visit, or \"check-up,\" with your doctor. You might also hear it called a \"wellness visit\" or \"preventive visit.\"  During each visit, the doctor will:   Ask about your physical and mental health   Ask about your habits, behaviors, and lifestyle   Do an exam   Give you vaccines if needed   Talk to you about any medicines you take   Give advice about your health   Answer your questions  Getting regular check-ups is an important part of taking care of your health. It can help your doctor find and treat any problems you have. But it's also important for preventing health problems.  A routine physical is different from a \"sick visit.\" A sick visit is when you see a doctor because of a health concern or problem. Since physicals are scheduled ahead of time, you can think about what you want to ask the doctor.  How often should I get a physical? -- It depends on your age and health. In general, for people age 21 years and older:   If you are younger than 50 years, you might be able to get a physical every 3 years.   If you are 50 years or older, your doctor might recommend a physical every year.  If you have an ongoing health condition, like diabetes or high blood pressure, your doctor will probably want to see you more often.  What happens during a physical? -- In general, each visit will include:   Physical exam - The doctor or nurse will check your height, weight, heart rate, and blood pressure. They will also look at your eyes and ears. They will ask about how you are feeling and whether you have any symptoms that bother you.   Medicines - It's a good idea to bring a list of all the medicines you take to each doctor visit. Your doctor will talk to you about your medicines and answer any questions. Tell them if you are having any side effects that bother you. You " "should also tell them if you are having trouble paying for any of your medicines.   Habits and behaviors - This includes:   Your diet   Your exercise habits   Whether you smoke, drink alcohol, or use drugs   Whether you are sexually active   Whether you feel safe at home  Your doctor will talk to you about things you can do to improve your health and lower your risk of health problems. They will also offer help and support. For example, if you want to quit smoking, they can give you advice and might prescribe medicines. If you want to improve your diet or get more physical activity, they can help you with this, too.   Lab tests, if needed - The tests you get will depend on your age and situation. For example, your doctor might want to check your:   Cholesterol   Blood sugar   Iron level   Vaccines - The recommended vaccines will depend on your age, health, and what vaccines you already had. Vaccines are very important because they can prevent certain serious or deadly infections.   Discussion of screening - \"Screening\" means checking for diseases or other health problems before they cause symptoms. Your doctor can recommend screening based on your age, risk, and preferences. This might include tests to check for:   Cancer, such as breast, prostate, cervical, ovarian, colorectal, prostate, lung, or skin cancer   Sexually transmitted infections, such as chlamydia and gonorrhea   Mental health conditions like depression and anxiety  Your doctor will talk to you about the different types of screening tests. They can help you decide which screenings to have. They can also explain what the results might mean.   Answering questions - The physical is a good time to ask the doctor or nurse questions about your health. If needed, they can refer you to other doctors or specialists, too.  Adults older than 65 years often need other care, too. As you get older, your doctor will talk to you about:   How to prevent falling at " home   Hearing or vision tests   Memory testing   How to take your medicines safely   Making sure that you have the help and support you need at home  All topics are updated as new evidence becomes available and our peer review process is complete.  This topic retrieved from [employer] on: May 02, 2024.  Topic 623773 Version 1.0  Release: 32.4.3 - C32.122  © 2024 UpToDate, Inc. and/or its affiliates. All rights reserved.  Consumer Information Use and Disclaimer   Disclaimer: This generalized information is a limited summary of diagnosis, treatment, and/or medication information. It is not meant to be comprehensive and should be used as a tool to help the user understand and/or assess potential diagnostic and treatment options. It does NOT include all information about conditions, treatments, medications, side effects, or risks that may apply to a specific patient. It is not intended to be medical advice or a substitute for the medical advice, diagnosis, or treatment of a health care provider based on the health care provider's examination and assessment of a patient's specific and unique circumstances. Patients must speak with a health care provider for complete information about their health, medical questions, and treatment options, including any risks or benefits regarding use of medications. This information does not endorse any treatments or medications as safe, effective, or approved for treating a specific patient. UpToDate, Inc. and its affiliates disclaim any warranty or liability relating to this information or the use thereof.The use of this information is governed by the Terms of Use, available at https://www.woltersCrowdparkuwer.com/en/know/clinical-effectiveness-terms. 2024© UpToDate, Inc. and its affiliates and/or licensors. All rights reserved.  Copyright   © 2024 Up[employer]Date, Inc. and/or its affiliates. All rights reserved.

## 2025-06-04 NOTE — PROGRESS NOTES
Assessment & Plan  Women's annual routine gynecological examination         Cervical cancer screening    Orders:    Liquid-based pap, screening    Surveillance for birth control, oral contraceptives    Orders:    norethindrone-ethinyl estradiol (Loestrin Fe ) 1-20 MG-MCG per tablet; Take 1 tablet by mouth daily      ASSESSMENT & PLAN: LYNDON is a 30 y.o.  with normal gynecologic exam.    1.  Routine well woman exam done today.  2.  Cervical Cancer Screening: Pap was done today.  Current ASCCP Guidelines reviewed.   3.  LYNDON declined STD testing in a mutually monogamous relationship  4.  Gardasil is recommended for patients from 9-45 years of age. LYNDON has had the Gardasil vaccine series.   5. She accepted contraception OCP. Take as directed.  VTE risk reviewed. Prescription was sent to the pharmacy. Reviewed zepbound can decrease contraception effectiveness of OCP and advised Back up method.   6. I recommend monthly self-breast exams, exercise a minimum of 150 minutes each week including weight bearing exercises while maintaining a healthy diet including adequate intake of Calcium and Vitamin D.   7. Return to office in 12 months for annual exam.   8. Call your insurance company to verify coverage prior to completing any ordered tests.     Subjective     HPI   LYNDON Reed is a 30 y.o.  female who presents for annual well woman exam.     Menses are irregular, some months are absent, other months will bleeding on placebo week.   denies vaginal discharge, labial erythema or lesions.   denies vaginal itching, irritation and dryness.  Contraception: OCP - Junel   denies history of diabetes, HTN, migraine with aura, blood clots, smoking, kidney/liver disease and family/personal history of bleeding disorders.    Patient is sexually active with male partner/  of 4 years. Monogamous and feels safe in this relationship. Denies problems with intercourse.   She has no urinary concerns, does not  have incontinence. denies breast concerns.   Negative [place] 2024  denies gynecologic surgeries.  Patient does have a family history of breast, endometrial, colon, or ovarian ca. Cancer-related family history includes Breast cancer (age of onset: 50) in her mother; Cancer in her father and mother.    OB:  OB History    Para Term  AB Living   0 0 0 0 0 0   SAB IAB Ectopic Multiple Live Births   0 0 0 0 0       Health Maintenance:    Active lifestyle   She does follow a well balanced diet.    She does not use tobacco and social alcohol  She does follow with a PCP.    Screening:  Cervical cancer screening: last pap smear in 2022. Results were NILM. Patient has received Gardasil vaccine.   History of abnormal pap smears: denies  Breast cancer screening: Will begin at age 40 per ACOG guidelines.   Colon cancer screening: Will begin at age 45.   STD screening: declined.    Social History:  Social History     Socioeconomic History    Marital status: Single     Spouse name: Not on file    Number of children: Not on file    Years of education: Not on file    Highest education level: Not on file   Occupational History    Not on file   Tobacco Use    Smoking status: Former     Types: Cigarettes     Passive exposure: Never    Smokeless tobacco: Never    Tobacco comments:     History social smoking 2012 quit   Vaping Use    Vaping status: Never Used   Substance and Sexual Activity    Alcohol use: Not Currently    Drug use: Not Currently     Types: Cocaine     Comment: quit     Sexual activity: Yes     Partners: Male     Birth control/protection: OCP   Other Topics Concern    Not on file   Social History Narrative    Always uses seat belt    Caffeine use    Exercise habits    No living will    No preference on Scientologist beliefs    Spouse/family support     Social Drivers of Health     Financial Resource Strain: Not on file   Food Insecurity: No Food Insecurity (2025)    Nursing - Inadequate Food Risk  Classification     Worried About Running Out of Food in the Last Year: Never true     Ran Out of Food in the Last Year: Never true     Ran Out of Food in the Last Year: Not on file   Transportation Needs: No Transportation Needs (2025)    PRAPARE - Transportation     Lack of Transportation (Medical): No     Lack of Transportation (Non-Medical): No   Physical Activity: Not on file   Stress: Not on file   Social Connections: Not on file   Intimate Partner Violence: Not on file   Housing Stability: Low Risk  (2025)    Housing Stability Vital Sign     Unable to Pay for Housing in the Last Year: No     Number of Times Moved in the Last Year: 0     Homeless in the Last Year: No       Social History[1]    Review of Systems   Constitutional: Negative.  Negative for activity change, appetite change and fever.   Respiratory:  Negative for shortness of breath.    Cardiovascular:  Negative for chest pain.   Gastrointestinal:  Negative for abdominal pain, constipation, diarrhea and vomiting.   Genitourinary:  Negative for dyspareunia, dysuria, frequency, menstrual problem, pelvic pain, vaginal bleeding, vaginal discharge and vaginal pain.   Skin:  Negative for color change.   Psychiatric/Behavioral: Negative.     All other systems reviewed and are negative.      The following portions of the patient's history were reviewed and updated as appropriate: allergies, current medications, past family history, past medical history, past social history, past surgical history, and problem list.         OB History          0    Para   0    Term   0       0    AB   0    Living   0         SAB   0    IAB   0    Ectopic   0    Multiple   0    Live Births   0                 Past Medical History[2]    Past Surgical History[3]    Family History[4]    Current Medications[5]    Allergies[6]    Objective   Vitals:    25 0904   BP: 120/82   BP Location: Left arm   Patient Position: Sitting   Cuff Size: Standard    Weight: 83.6 kg (184 lb 6.4 oz)     Physical Exam  Vitals and nursing note reviewed.   Constitutional:       General: She is not in acute distress.     Appearance: Normal appearance. She is not ill-appearing.   HENT:      Head: Normocephalic.   Neck:      Thyroid: No thyromegaly or thyroid tenderness.     Cardiovascular:      Rate and Rhythm: Normal rate and regular rhythm.      Heart sounds: Normal heart sounds.   Pulmonary:      Effort: Pulmonary effort is normal. No respiratory distress.      Breath sounds: Normal breath sounds.   Chest:   Breasts:     Right: Normal. No inverted nipple, nipple discharge, skin change or tenderness.      Left: Normal. No inverted nipple, nipple discharge, skin change or tenderness.   Abdominal:      General: Abdomen is flat.      Palpations: Abdomen is soft.      Tenderness: There is no abdominal tenderness. There is no guarding.   Genitourinary:     General: Normal vulva.      Exam position: Lithotomy position.      Labia:         Right: No rash, tenderness or lesion.         Left: No rash, tenderness or lesion.       Urethra: No prolapse.      Vagina: Normal. No vaginal discharge, erythema, tenderness or lesions.      Cervix: Normal. No cervical motion tenderness, discharge or lesion.      Uterus: Not tender and no uterine prolapse.       Adnexa:         Right: No tenderness.          Left: No tenderness.       Musculoskeletal:      Cervical back: Neck supple.   Lymphadenopathy:      Cervical: No cervical adenopathy.     Skin:     General: Skin is warm and dry.      Capillary Refill: Capillary refill takes less than 2 seconds.     Neurological:      General: No focal deficit present.      Mental Status: She is alert and oriented to person, place, and time.     Psychiatric:         Mood and Affect: Mood normal.         Behavior: Behavior normal.         Thought Content: Thought content normal.       Shanika ESPARZA  OB/GYN  6/9/2025  9:29 AM          [1]   Social  History  Tobacco Use    Smoking status: Former     Types: Cigarettes     Passive exposure: Never    Smokeless tobacco: Never    Tobacco comments:     History social smoking 2012 quit   Vaping Use    Vaping status: Never Used   Substance Use Topics    Alcohol use: Not Currently    Drug use: Not Currently     Types: Cocaine     Comment: quit 2016   [2]   Past Medical History:  Diagnosis Date    Acne     Allergic     Allergic rhinitis     Anesthesia complication     woke up in the middle of wisdom teeth removal    Anxiety 02/20/2018    Asthma     Asthma     Depression     Functional murmur 11/19/2012    History of back pain     History of depression     History of impacted cerumen     unspecified laterality    History of pharyngitis     History of streptococcal pharyngitis     History of tonsillitis     History of URI (upper respiratory infection)     Obesity 11/19/2012    Recurrent major depressive disorder, in partial remission (HCC) 02/20/2018    Seasonal allergies     Sinus problem     Tinnitus     TMJ dysfunction     Toe fracture, left 02/18/2024    5th toe    Tooth wear     gem with dental bond-upper left    Viral illness 11/02/2023   [3]   Past Surgical History:  Procedure Laterality Date    ORIF FOOT FRACTURE Left 2/27/2024    Procedure: LEFT FOOT 5TH METATARSAL OPEN REDUCTION WITH INTERNAL FIXATION;  Surgeon: Jamshid Escobar DPM;  Location[facility];  Service: Podiatry    WISDOM TOOTH EXTRACTION     [4]   Family History  Problem Relation Name Age of Onset    Heart disease Mother Saroj     Hypothyroidism Mother Saroj     Osteoporosis Mother Saroj     Breast cancer Mother Saroj 50    Thyroid disease Mother Saroj     Cancer Mother Saroj         Breast cancer    Diabetes Father Aubrey Reed     Arthritis Father Aubrey Reed     Asthma Father Aubrey Reed     Cancer Father Aubrey Reed         Basal    Heart defect Other Grandfather         Cardiac disorder    Mental illness Neg  Hx          Mother   [5]   Current Outpatient Medications:     albuterol (Ventolin HFA) 90 mcg/act inhaler, Inhale 2 puffs every 4 (four) hours as needed for wheezing, Disp: 18 g, Rfl: 5    Clocortolone Pivalate (Cloderm) 0.1 % cream, Apply topically 2 (two) times a day To affected area, Disp: 45 g, Rfl: 0    FLUoxetine 20 MG tablet, Take 1 tablet (20 mg total) by mouth daily, Disp: 90 tablet, Rfl: 1    levocetirizine (XYZAL) 5 MG tablet, Take 1 tablet (5 mg total) by mouth every evening, Disp: 90 tablet, Rfl: 1    MAGNESIUM PO, Take by mouth daily at bedtime, Disp: , Rfl:     montelukast (SINGULAIR) 10 mg tablet, Take 1 tablet (10 mg total) by mouth daily at bedtime, Disp: 90 tablet, Rfl: 3    Multiple Vitamin (multivitamin) capsule, Take 1 capsule by mouth in the morning., Disp: , Rfl:     norethindrone-ethinyl estradiol (Loestrin Fe 1/20) 1-20 MG-MCG per tablet, Take 1 tablet by mouth daily, Disp: 84 tablet, Rfl: 4    Omega-3 Fatty Acids (FISH OIL PO), Take by mouth daily at bedtime, Disp: , Rfl:     ondansetron (ZOFRAN) 4 mg tablet, Take 1 tablet (4 mg total) by mouth every 6 (six) hours, Disp: 12 tablet, Rfl: 0    tirzepatide (Zepbound) 2.5 mg/0.5 mL auto-injector, Inject 0.5 mL (2.5 mg total) under the skin once a week for 28 days (Patient not taking: Reported on 6/9/2025), Disp: 2 mL, Rfl: 0    Elderberry 500 MG CAPS, Take by mouth (Patient not taking: Reported on 6/9/2025), Disp: , Rfl:     fexofenadine (ALLEGRA) 180 MG tablet, Take by mouth (Patient not taking: Reported on 5/23/2025), Disp: , Rfl:     fluticasone (FLONASE) 50 mcg/act nasal spray, 1 spray into each nostril daily (Patient not taking: Reported on 6/9/2025), Disp: 15.8 mL, Rfl: 5    mometasone (Asmanex, 30 Metered Doses,) 110 mcg/actuation AEPB inhaler, Inhale 2 puffs every evening Rinse mouth after use. (Patient not taking: Reported on 6/9/2025), Disp: 1 each, Rfl: 1    Semaglutide-Weight Management (Wegovy) 2.4 MG/0.75ML, Inject 2.4 mg under  the skin weekly (Patient not taking: Reported on 6/9/2025), Disp: 3 mL, Rfl: 5  [6]   Allergies  Allergen Reactions    P[place] - Food Allergy Anaphylaxis     Itching/closing of the throat    Amoxicillin GI Intolerance    Mold Extract [Trichophyton] Allergic Rhinitis    Seasonal Ic [Cholestatin] Allergic Rhinitis

## 2025-06-05 NOTE — TELEPHONE ENCOUNTER
Patient called in stating that she had requested a prescription for Zepbound and it has not been sent. Please review and send in to  [facility] in [place].

## 2025-06-06 DIAGNOSIS — E66.9 OBESITY (BMI 30-39.9): Primary | ICD-10-CM

## 2025-06-06 RX ORDER — TIRZEPATIDE 2.5 MG/.5ML
2.5 INJECTION, SOLUTION SUBCUTANEOUS WEEKLY
Qty: 2 ML | Refills: 0 | Status: SHIPPED | OUTPATIENT
Start: 2025-06-06 | End: 2025-07-04

## 2025-06-09 ENCOUNTER — ANNUAL EXAM (OUTPATIENT)
Dept: OBGYN CLINIC | Facility: CLINIC | Age: 30
End: 2025-06-09
Payer: COMMERCIAL

## 2025-06-09 VITALS — WEIGHT: 184.4 LBS | BODY MASS INDEX: 33.85 KG/M2 | DIASTOLIC BLOOD PRESSURE: 82 MMHG | SYSTOLIC BLOOD PRESSURE: 120 MMHG

## 2025-06-09 DIAGNOSIS — Z30.41 SURVEILLANCE FOR BIRTH CONTROL, ORAL CONTRACEPTIVES: ICD-10-CM

## 2025-06-09 DIAGNOSIS — Z12.4 CERVICAL CANCER SCREENING: ICD-10-CM

## 2025-06-09 DIAGNOSIS — Z01.419 WOMEN'S ANNUAL ROUTINE GYNECOLOGICAL EXAMINATION: Primary | ICD-10-CM

## 2025-06-09 PROCEDURE — G0145 SCR C/V CYTO,THINLAYER,RESCR: HCPCS

## 2025-06-09 PROCEDURE — S0612 ANNUAL GYNECOLOGICAL EXAMINA: HCPCS

## 2025-06-09 PROCEDURE — G0476 HPV COMBO ASSAY CA SCREEN: HCPCS

## 2025-06-09 RX ORDER — NORETHINDRONE ACETATE AND ETHINYL ESTRADIOL 1MG-20(21)
1 KIT ORAL DAILY
Qty: 84 TABLET | Refills: 4 | Status: SHIPPED | OUTPATIENT
Start: 2025-06-09

## 2025-06-10 LAB
HPV HR 12 DNA CVX QL NAA+PROBE: NEGATIVE
HPV16 DNA CVX QL NAA+PROBE: NEGATIVE
HPV18 DNA CVX QL NAA+PROBE: NEGATIVE

## 2025-06-11 ENCOUNTER — RESULTS FOLLOW-UP (OUTPATIENT)
Dept: OBGYN CLINIC | Facility: CLINIC | Age: 30
End: 2025-06-11

## 2025-06-12 LAB
LAB AP GYN PRIMARY INTERPRETATION: NORMAL
Lab: NORMAL

## 2025-08-01 DIAGNOSIS — E66.9 OBESITY (BMI 30-39.9): Primary | ICD-10-CM

## 2025-08-01 RX ORDER — TIRZEPATIDE 5 MG/.5ML
5 INJECTION, SOLUTION SUBCUTANEOUS WEEKLY
Qty: 2 ML | Refills: 0 | Status: SHIPPED | OUTPATIENT
Start: 2025-08-01

## 2025-08-15 ENCOUNTER — TELEPHONE (OUTPATIENT)
Age: 30
End: 2025-08-15

## 2025-08-22 ENCOUNTER — OFFICE VISIT (OUTPATIENT)
Dept: FAMILY MEDICINE CLINIC | Facility: CLINIC | Age: 30
End: 2025-08-22
Payer: COMMERCIAL

## 2025-08-22 VITALS
WEIGHT: 185 LBS | BODY MASS INDEX: 33.96 KG/M2 | DIASTOLIC BLOOD PRESSURE: 78 MMHG | HEART RATE: 88 BPM | SYSTOLIC BLOOD PRESSURE: 122 MMHG | OXYGEN SATURATION: 100 % | TEMPERATURE: 98.6 F

## 2025-08-22 DIAGNOSIS — F32.2 SEVERE MAJOR DEPRESSIVE DISORDER (HCC): ICD-10-CM

## 2025-08-22 DIAGNOSIS — J45.21 MILD INTERMITTENT ASTHMA WITH EXACERBATION: Primary | ICD-10-CM

## 2025-08-22 DIAGNOSIS — E66.9 OBESITY (BMI 30-39.9): ICD-10-CM

## 2025-08-22 PROCEDURE — 99214 OFFICE O/P EST MOD 30 MIN: CPT | Performed by: NURSE PRACTITIONER

## 2025-08-22 RX ORDER — TIRZEPATIDE 7.5 MG/.5ML
7.5 INJECTION, SOLUTION SUBCUTANEOUS WEEKLY
Qty: 2 ML | Refills: 0 | Status: SHIPPED | OUTPATIENT
Start: 2025-08-22